# Patient Record
Sex: MALE | Race: BLACK OR AFRICAN AMERICAN | NOT HISPANIC OR LATINO
[De-identification: names, ages, dates, MRNs, and addresses within clinical notes are randomized per-mention and may not be internally consistent; named-entity substitution may affect disease eponyms.]

---

## 2019-04-30 ENCOUNTER — APPOINTMENT (OUTPATIENT)
Dept: INTERNAL MEDICINE | Facility: CLINIC | Age: 59
End: 2019-04-30

## 2019-07-29 ENCOUNTER — APPOINTMENT (OUTPATIENT)
Dept: INTERNAL MEDICINE | Facility: CLINIC | Age: 59
End: 2019-07-29
Payer: MEDICARE

## 2019-07-29 VITALS
SYSTOLIC BLOOD PRESSURE: 142 MMHG | BODY MASS INDEX: 42.66 KG/M2 | DIASTOLIC BLOOD PRESSURE: 86 MMHG | WEIGHT: 315 LBS | HEIGHT: 72 IN

## 2019-07-29 DIAGNOSIS — Z82.49 FAMILY HISTORY OF ISCHEMIC HEART DISEASE AND OTHER DISEASES OF THE CIRCULATORY SYSTEM: ICD-10-CM

## 2019-07-29 DIAGNOSIS — Z00.00 ENCOUNTER FOR GENERAL ADULT MEDICAL EXAMINATION W/OUT ABNORMAL FINDINGS: ICD-10-CM

## 2019-07-29 DIAGNOSIS — I25.10 ATHEROSCLEROTIC HEART DISEASE OF NATIVE CORONARY ARTERY W/OUT ANGINA PECTORIS: ICD-10-CM

## 2019-07-29 DIAGNOSIS — Z78.9 OTHER SPECIFIED HEALTH STATUS: ICD-10-CM

## 2019-07-29 PROCEDURE — 90715 TDAP VACCINE 7 YRS/> IM: CPT | Mod: GY

## 2019-07-29 PROCEDURE — 90472 IMMUNIZATION ADMIN EACH ADD: CPT | Mod: GY

## 2019-07-29 PROCEDURE — 99204 OFFICE O/P NEW MOD 45 MIN: CPT | Mod: 25

## 2019-07-29 PROCEDURE — 90732 PPSV23 VACC 2 YRS+ SUBQ/IM: CPT

## 2019-07-29 PROCEDURE — 36415 COLL VENOUS BLD VENIPUNCTURE: CPT

## 2019-07-29 PROCEDURE — G0009: CPT

## 2019-07-29 NOTE — PHYSICAL EXAM
[No JVD] : no jugular venous distention [Normal] : no respiratory distress, lungs were clear to auscultation bilaterally and no accessory muscle use [Regular Rhythm] : with a regular rhythm [Normal S1, S2] : normal S1 and S2 [Soft] : abdomen soft [Non Tender] : non-tender [Normal Bowel Sounds] : normal bowel sounds [Normal Affect] : the affect was normal [Alert and Oriented x3] : oriented to person, place, and time [Normal Mood] : the mood was normal [Normal Insight/Judgement] : insight and judgment were intact [de-identified] : obese [de-identified] : II/VI systolic murmur [de-identified] : mild edema [de-identified] : surgical wounds healing well

## 2019-07-29 NOTE — HISTORY OF PRESENT ILLNESS
[FreeTextEntry1] : consultation  [de-identified] : Pt here to establish care and to follow up on chronic conditions. He was just diagnosed with CHF this year. He also had his appendix removed 2 weeks ago at [facility]. He is feeling well post operatively. No fever and he has been eating and defecating without issue. \par Pt states he also had a cardiac cath early on this year and had no stents placed. \par No chest pain or SOB currently. \par He does not exercise regularly but plans on starting.

## 2019-07-29 NOTE — HEALTH RISK ASSESSMENT
[Good] : ~his/her~ current health as good [Very Good] : ~his/her~  mood as very good [Yes] : Yes [Monthly or less (1 pt)] : Monthly or less (1 point) [1 or 2 (0 pts)] : 1 or 2 (0 points) [Never (0 pts)] : Never (0 points) [No] : In the past 12 months have you used drugs other than those required for medical reasons? No [No falls in past year] : Patient reported no falls in the past year [0] : 2) Feeling down, depressed, or hopeless: Not at all (0) [HIV Test offered] : HIV Test offered [Hepatitis C test offered] : Hepatitis C test offered [Patient reported colonoscopy was normal] : Patient reported colonoscopy was normal [] : No [de-identified] : walking [de-identified] : none [ColonoscopyDate] : 01/18 [ColonoscopyComments] : normal as per pt, in [place]

## 2019-07-29 NOTE — PLAN
[FreeTextEntry1] : Pneumococcal given today as pt has DM\par Also given Tdap\par Records for Dr Miller reviewed.

## 2019-07-29 NOTE — COUNSELING
[Weight management counseling provided] : Weight management [Healthy eating counseling provided] : healthy eating [Activity counseling provided] : activity [de-identified] : Pt counseled on proper diet and exercise. We discussed the importance of exercise in maintaining a healthy life style.\par

## 2019-07-30 LAB — TSH SERPL-ACNC: 2.31 UIU/ML

## 2019-07-31 LAB
ALBUMIN SERPL ELPH-MCNC: 4.5 G/DL
ALP BLD-CCNC: 64 U/L
ALT SERPL-CCNC: 12 U/L
ANION GAP SERPL CALC-SCNC: 13 MMOL/L
AST SERPL-CCNC: 17 U/L
BASOPHILS # BLD AUTO: 0.06 K/UL
BASOPHILS NFR BLD AUTO: 1 %
BILIRUB SERPL-MCNC: 0.5 MG/DL
BUN SERPL-MCNC: 29 MG/DL
CALCIUM SERPL-MCNC: 9.7 MG/DL
CHLORIDE SERPL-SCNC: 106 MMOL/L
CHOLEST SERPL-MCNC: 210 MG/DL
CHOLEST/HDLC SERPL: 4.5 RATIO
CO2 SERPL-SCNC: 28 MMOL/L
CREAT SERPL-MCNC: 1.51 MG/DL
EOSINOPHIL # BLD AUTO: 0.13 K/UL
EOSINOPHIL NFR BLD AUTO: 2.1 %
ESTIMATED AVERAGE GLUCOSE: 229 MG/DL
GLUCOSE SERPL-MCNC: 137 MG/DL
HBA1C MFR BLD HPLC: 9.6 %
HCT VFR BLD CALC: 54 %
HDLC SERPL-MCNC: 47 MG/DL
HGB BLD-MCNC: 15.6 G/DL
IMM GRANULOCYTES NFR BLD AUTO: 0.3 %
LDLC SERPL CALC-MCNC: 147 MG/DL
LYMPHOCYTES # BLD AUTO: 1.6 K/UL
LYMPHOCYTES NFR BLD AUTO: 26.2 %
MAN DIFF?: NORMAL
MCHC RBC-ENTMCNC: 24.2 PG
MCHC RBC-ENTMCNC: 28.9 GM/DL
MCV RBC AUTO: 83.9 FL
MONOCYTES # BLD AUTO: 0.61 K/UL
MONOCYTES NFR BLD AUTO: 10 %
NEUTROPHILS # BLD AUTO: 3.69 K/UL
NEUTROPHILS NFR BLD AUTO: 60.4 %
PLATELET # BLD AUTO: 200 K/UL
POTASSIUM SERPL-SCNC: 5.1 MMOL/L
PROT SERPL-MCNC: 7.3 G/DL
RBC # BLD: 6.44 M/UL
RBC # FLD: 16.4 %
SODIUM SERPL-SCNC: 146 MMOL/L
TRIGL SERPL-MCNC: 79 MG/DL
WBC # FLD AUTO: 6.11 K/UL

## 2019-07-31 RX ORDER — SIMVASTATIN 20 MG/1
20 TABLET, FILM COATED ORAL
Refills: 0 | Status: DISCONTINUED | COMMUNITY
End: 2019-07-31

## 2019-08-02 ENCOUNTER — RX RENEWAL (OUTPATIENT)
Age: 59
End: 2019-08-02

## 2019-08-07 ENCOUNTER — RX RENEWAL (OUTPATIENT)
Age: 59
End: 2019-08-07

## 2019-10-28 ENCOUNTER — APPOINTMENT (OUTPATIENT)
Dept: INTERNAL MEDICINE | Facility: CLINIC | Age: 59
End: 2019-10-28

## 2019-12-17 ENCOUNTER — APPOINTMENT (OUTPATIENT)
Dept: RHEUMATOLOGY | Facility: CLINIC | Age: 59
End: 2019-12-17

## 2019-12-17 ENCOUNTER — APPOINTMENT (OUTPATIENT)
Dept: INTERNAL MEDICINE | Facility: CLINIC | Age: 59
End: 2019-12-17
Payer: MEDICARE

## 2019-12-17 VITALS
SYSTOLIC BLOOD PRESSURE: 128 MMHG | HEIGHT: 72 IN | DIASTOLIC BLOOD PRESSURE: 66 MMHG | BODY MASS INDEX: 42.66 KG/M2 | WEIGHT: 315 LBS

## 2019-12-17 PROCEDURE — 36415 COLL VENOUS BLD VENIPUNCTURE: CPT

## 2019-12-17 PROCEDURE — G0008: CPT

## 2019-12-17 PROCEDURE — 90686 IIV4 VACC NO PRSV 0.5 ML IM: CPT

## 2019-12-17 PROCEDURE — 99214 OFFICE O/P EST MOD 30 MIN: CPT | Mod: 25

## 2019-12-17 NOTE — HEALTH RISK ASSESSMENT
[Yes] : Yes [1 or 2 (0 pts)] : 1 or 2 (0 points) [Never (0 pts)] : Never (0 points) [No falls in past year] : Patient reported no falls in the past year [0] : 2) Feeling down, depressed, or hopeless: Not at all (0) [Patient reported colonoscopy was normal] : Patient reported colonoscopy was normal [HIV Test offered] : HIV Test offered [Hepatitis C test offered] : Hepatitis C test offered [ColonoscopyDate] : 01/18 [ColonoscopyComments] : normal as per pt, in [place] [] : No [de-identified] : walking  [de-identified] : regular diet

## 2019-12-17 NOTE — PHYSICAL EXAM
[No JVD] : no jugular venous distention [Supple] : supple [No Respiratory Distress] : no respiratory distress  [No Accessory Muscle Use] : no accessory muscle use [Clear to Auscultation] : lungs were clear to auscultation bilaterally [Normal Rate] : normal rate  [Regular Rhythm] : with a regular rhythm [No Edema] : there was no peripheral edema [de-identified] : Morbidly Obesity [de-identified] : II-VI systolic murmur

## 2019-12-17 NOTE — HISTORY OF PRESENT ILLNESS
[FreeTextEntry1] : follow-up [de-identified] : Pt here for f/u visit. Says he has been eating more greens but has still gained weight. No chest pains or SOB. He tries to do some walking but gets pain in her legs. He is compliant with his insulin pre meals as well as basiglar and farixga.

## 2019-12-19 LAB
ALBUMIN SERPL ELPH-MCNC: 4.1 G/DL
ALP BLD-CCNC: 61 U/L
ALT SERPL-CCNC: 9 U/L
ANION GAP SERPL CALC-SCNC: 12 MMOL/L
AST SERPL-CCNC: 16 U/L
BILIRUB SERPL-MCNC: 1 MG/DL
BUN SERPL-MCNC: 18 MG/DL
CALCIUM SERPL-MCNC: 9.2 MG/DL
CHLORIDE SERPL-SCNC: 107 MMOL/L
CHOLEST SERPL-MCNC: 126 MG/DL
CHOLEST/HDLC SERPL: 3.3 RATIO
CO2 SERPL-SCNC: 25 MMOL/L
CREAT SERPL-MCNC: 1.31 MG/DL
GLUCOSE SERPL-MCNC: 89 MG/DL
HDLC SERPL-MCNC: 38 MG/DL
LDLC SERPL CALC-MCNC: 75 MG/DL
POTASSIUM SERPL-SCNC: 4.8 MMOL/L
PROT SERPL-MCNC: 6.9 G/DL
PSA SERPL-MCNC: 1.09 NG/ML
SODIUM SERPL-SCNC: 144 MMOL/L
TRIGL SERPL-MCNC: 64 MG/DL

## 2019-12-23 LAB
CREAT SPEC-SCNC: 170 MG/DL
ESTIMATED AVERAGE GLUCOSE: 166 MG/DL
HBA1C MFR BLD HPLC: 7.4 %
MICROALBUMIN 24H UR DL<=1MG/L-MCNC: 145.3 MG/DL
MICROALBUMIN/CREAT 24H UR-RTO: 855 MG/G

## 2020-01-21 ENCOUNTER — RX RENEWAL (OUTPATIENT)
Age: 60
End: 2020-01-21

## 2020-03-13 ENCOUNTER — APPOINTMENT (OUTPATIENT)
Dept: RHEUMATOLOGY | Facility: CLINIC | Age: 60
End: 2020-03-13
Payer: MEDICARE

## 2020-03-13 ENCOUNTER — APPOINTMENT (OUTPATIENT)
Dept: INTERNAL MEDICINE | Facility: CLINIC | Age: 60
End: 2020-03-13
Payer: MEDICARE

## 2020-03-13 VITALS
DIASTOLIC BLOOD PRESSURE: 82 MMHG | HEIGHT: 72 IN | BODY MASS INDEX: 42.66 KG/M2 | WEIGHT: 315 LBS | SYSTOLIC BLOOD PRESSURE: 144 MMHG

## 2020-03-13 VITALS
DIASTOLIC BLOOD PRESSURE: 82 MMHG | HEIGHT: 72 IN | SYSTOLIC BLOOD PRESSURE: 144 MMHG | BODY MASS INDEX: 42.66 KG/M2 | WEIGHT: 315 LBS

## 2020-03-13 DIAGNOSIS — Z87.2 PERSONAL HISTORY OF DISEASES OF THE SKIN AND SUBCUTANEOUS TISSUE: ICD-10-CM

## 2020-03-13 DIAGNOSIS — Z12.5 ENCOUNTER FOR SCREENING FOR MALIGNANT NEOPLASM OF PROSTATE: ICD-10-CM

## 2020-03-13 PROCEDURE — 99204 OFFICE O/P NEW MOD 45 MIN: CPT | Mod: 25

## 2020-03-13 PROCEDURE — 99214 OFFICE O/P EST MOD 30 MIN: CPT

## 2020-03-13 PROCEDURE — 36415 COLL VENOUS BLD VENIPUNCTURE: CPT

## 2020-03-13 NOTE — HISTORY OF PRESENT ILLNESS
[FreeTextEntry1] : follow-up [de-identified] : Pt here for f/u visit. He recently had a defib placed. He was in the midst of moving and realized that he was more SOB than he usually would be. That night he had a lot of swelling, including scrotal swelling. He was taken to Er and admitted to [facility]. He had an echo done and the next day a defib was placed. Pt feels well now. Still sore from the PPM plmt. No chest pain or unusual SOB. Still has mild swelling in LE. \par Pt has been eating a lot of greens and trying to consume a healthy diet.

## 2020-03-13 NOTE — COUNSELING
[Potential consequences of obesity discussed] : Potential consequences of obesity discussed [Benefits of weight loss discussed] : Benefits of weight loss discussed [Encouraged to increase physical activity] : Encouraged to increase physical activity [Encouraged to use exercise tracking device] : Encouraged to use exercise tracking device [FreeTextEntry1] : weight watchers [FreeTextEntry2] : Pt counseled on proper diet and exercise. We discussed the importance of exercise in maintaining a healthy life style.\par

## 2020-03-13 NOTE — REVIEW OF SYSTEMS
[Lower Ext Edema] : lower extremity edema [Negative] : Psychiatric [Fever] : no fever [Chills] : no chills [Abdominal Pain] : no abdominal pain [Nausea] : no nausea [Diarrhea] : no diarrhea [Vomiting] : no vomiting [Easy Bleeding] : no easy bleeding

## 2020-03-13 NOTE — PHYSICAL EXAM
[No Acute Distress] : no acute distress [Well-Appearing] : well-appearing [No JVD] : no jugular venous distention [Supple] : supple [No Respiratory Distress] : no respiratory distress  [No Accessory Muscle Use] : no accessory muscle use [Clear to Auscultation] : lungs were clear to auscultation bilaterally [Normal Rate] : normal rate  [Regular Rhythm] : with a regular rhythm [Normal S1, S2] : normal S1 and S2 [Coordination Grossly Intact] : coordination grossly intact [No Focal Deficits] : no focal deficits [Normal Gait] : normal gait [Normal Affect] : the affect was normal [Alert and Oriented x3] : oriented to person, place, and time [Normal Mood] : the mood was normal [Normal Insight/Judgement] : insight and judgment were intact [de-identified] : Morbidly Obese [de-identified] : II-VI systolic murmur, blowing [de-identified] : mild edema b/l LE, no ulcers

## 2020-03-13 NOTE — HEALTH RISK ASSESSMENT
[No] : In the past 12 months have you used drugs other than those required for medical reasons? No [No falls in past year] : Patient reported no falls in the past year [0] : 2) Feeling down, depressed, or hopeless: Not at all (0) [Patient reported colonoscopy was normal] : Patient reported colonoscopy was normal [HIV Test offered] : HIV Test offered [Hepatitis C test offered] : Hepatitis C test offered [] : No [de-identified] : none [de-identified] : none [ColonoscopyDate] : 01/18 [ColonoscopyComments] : normal as per pt, in [place]

## 2020-03-15 LAB
25(OH)D3 SERPL-MCNC: 12.2 NG/ML
ALBUMIN SERPL ELPH-MCNC: 4.1 G/DL
ALP BLD-CCNC: 65 U/L
ALT SERPL-CCNC: 13 U/L
ANA SER IF-ACNC: NEGATIVE
ANION GAP SERPL CALC-SCNC: 16 MMOL/L
AST SERPL-CCNC: 18 U/L
BASOPHILS # BLD AUTO: 0.04 K/UL
BASOPHILS NFR BLD AUTO: 0.7 %
BILIRUB SERPL-MCNC: 0.4 MG/DL
BUN SERPL-MCNC: 14 MG/DL
CALCIUM SERPL-MCNC: 9.1 MG/DL
CHLORIDE SERPL-SCNC: 108 MMOL/L
CHROMATIN AB SERPL-ACNC: <0.2 AL
CO2 SERPL-SCNC: 20 MMOL/L
CREAT SERPL-MCNC: 1.32 MG/DL
CRP SERPL-MCNC: 1.3 MG/DL
ENA JO1 AB SER IA-ACNC: <0.2 AL
ENA RNP AB SER IA-ACNC: <0.2 AL
ENA SM AB SER IA-ACNC: <0.2 AL
ENA SS-A AB SER IA-ACNC: <0.2 AL
ENA SS-B AB SER IA-ACNC: <0.2 AL
EOSINOPHIL # BLD AUTO: 0.23 K/UL
EOSINOPHIL NFR BLD AUTO: 3.8 %
ERYTHROCYTE [SEDIMENTATION RATE] IN BLOOD BY WESTERGREN METHOD: 26 MM/HR
GLUCOSE SERPL-MCNC: 121 MG/DL
HCT VFR BLD CALC: 45.7 %
HGB BLD-MCNC: 13.7 G/DL
IMM GRANULOCYTES NFR BLD AUTO: 0.3 %
LYMPHOCYTES # BLD AUTO: 1.37 K/UL
LYMPHOCYTES NFR BLD AUTO: 22.5 %
MAN DIFF?: NORMAL
MCHC RBC-ENTMCNC: 24.8 PG
MCHC RBC-ENTMCNC: 30 GM/DL
MCV RBC AUTO: 82.8 FL
MONOCYTES # BLD AUTO: 0.61 K/UL
MONOCYTES NFR BLD AUTO: 10 %
NEUTROPHILS # BLD AUTO: 3.82 K/UL
NEUTROPHILS NFR BLD AUTO: 62.7 %
PLATELET # BLD AUTO: 181 K/UL
POTASSIUM SERPL-SCNC: 4.2 MMOL/L
PROT SERPL-MCNC: 7 G/DL
RBC # BLD: 5.52 M/UL
RBC # FLD: 15 %
RHEUMATOID FACT SER QL: <10 IU/ML
SODIUM SERPL-SCNC: 144 MMOL/L
WBC # FLD AUTO: 6.09 K/UL

## 2020-03-16 LAB
CCP AB SER IA-ACNC: <8 UNITS
DSDNA AB SER-ACNC: <12 IU/ML
RF+CCP IGG SER-IMP: NEGATIVE

## 2020-03-17 LAB
ACE BLD-CCNC: 24 U/L
HLA-B27 RELATED AG QL: NORMAL
RNA POLYMERASE III IGG: 4 UNITS

## 2020-03-20 NOTE — HISTORY OF PRESENT ILLNESS
[FreeTextEntry1] : 58 yo male referred by Dr. Dumont for further evaluation of joint pains.  He was diagnosed with reactive arthritis in 2004.  He was seeing a pain management doctor.\par In 2004 he was going about his usual business and he developed a pain and swelling in his left foot.  He thought it was gout, but was told it was not.  He was given antiinflammatories.  The pain radiated up his legs and into his .  the pain was so bad he called 911 and was taken to the hospital.  He was hospitalized for 3 months bc they did not know what he had.  Eventually he was diagnosed with reactive arthritis.  the pain just subsided on his own.  he was wheelchair bound, then on a walker, then crutches and had to relearn how to walk.\par He gets intermittent pains.  Pain management doctor gives him Percocet for the pain.\par He had an AICD a few weeks ago bc he had bad CHF.\par He has good days and bad days.  Pain comes on mostly in his "hip " area (points to side of thighs)\par Rare swelling/pain in his feet.  No symptoms in his hands.  No rashes.\par No major low back pain.  No numbness/tingling.\par No incontinence.\par No oral ulcers.  No dry eyes or mouth.\par No Raynauds.\par no FH of arthritis or autoimmune disease

## 2020-04-03 ENCOUNTER — APPOINTMENT (OUTPATIENT)
Dept: RHEUMATOLOGY | Facility: CLINIC | Age: 60
End: 2020-04-03
Payer: MEDICARE

## 2020-04-03 PROCEDURE — G2012 BRIEF CHECK IN BY MD/QHP: CPT

## 2020-05-14 ENCOUNTER — RX RENEWAL (OUTPATIENT)
Age: 60
End: 2020-05-14

## 2020-06-12 ENCOUNTER — APPOINTMENT (OUTPATIENT)
Dept: INTERNAL MEDICINE | Facility: CLINIC | Age: 60
End: 2020-06-12

## 2020-07-09 ENCOUNTER — APPOINTMENT (OUTPATIENT)
Dept: INTERNAL MEDICINE | Facility: CLINIC | Age: 60
End: 2020-07-09
Payer: MEDICARE

## 2020-07-09 VITALS
HEIGHT: 72 IN | WEIGHT: 315 LBS | SYSTOLIC BLOOD PRESSURE: 132 MMHG | TEMPERATURE: 98.6 F | BODY MASS INDEX: 42.66 KG/M2 | DIASTOLIC BLOOD PRESSURE: 76 MMHG

## 2020-07-09 PROCEDURE — 36415 COLL VENOUS BLD VENIPUNCTURE: CPT

## 2020-07-09 PROCEDURE — 99214 OFFICE O/P EST MOD 30 MIN: CPT | Mod: 25

## 2020-07-09 NOTE — HEALTH RISK ASSESSMENT
[No] : In the past 12 months have you used drugs other than those required for medical reasons? No [No falls in past year] : Patient reported no falls in the past year [0] : 2) Feeling down, depressed, or hopeless: Not at all (0) [Patient reported colonoscopy was normal] : Patient reported colonoscopy was normal [HIV Test offered] : HIV Test offered [Hepatitis C test offered] : Hepatitis C test offered [With Significant Other] : lives with significant other [] :  [None] : None [On disability] : on disability [Feels Safe at Home] : Feels safe at home [Fully functional (bathing, dressing, toileting, transferring, walking, feeding)] : Fully functional (bathing, dressing, toileting, transferring, walking, feeding) [Fully functional (using the telephone, shopping, preparing meals, housekeeping, doing laundry, using] : Fully functional and needs no help or supervision to perform IADLs (using the telephone, shopping, preparing meals, housekeeping, doing laundry, using transportation, managing medications and managing finances) [] : No [de-identified] : walking  [de-identified] : none [ColonoscopyDate] : 01/18 [ColonoscopyComments] : normal as per pt, in [place]

## 2020-07-09 NOTE — HISTORY OF PRESENT ILLNESS
[FreeTextEntry1] : follow-up  [de-identified] : Pt here for reg f/u visit. Pt had some LE swelling about 1 month ago and was inpatient at [facility]. He was given diuretics and d/cd him on diuretics.  Currently swelling is much better. No chest pains or SOB.\par He has been walking for exercise and has lost 14 lb in 3 months. \par He has also now been following with Dr Saha.

## 2020-07-09 NOTE — REVIEW OF SYSTEMS
[Lower Ext Edema] : lower extremity edema [Fever] : no fever [Negative] : Gastrointestinal [FreeTextEntry2] : -14 lb weight loss [FreeTextEntry9] : no pain currently

## 2020-07-09 NOTE — PHYSICAL EXAM
[No JVD] : no jugular venous distention [Well-Appearing] : well-appearing [No Acute Distress] : no acute distress [No Accessory Muscle Use] : no accessory muscle use [No Respiratory Distress] : no respiratory distress  [Supple] : supple [Normal Rate] : normal rate  [Clear to Auscultation] : lungs were clear to auscultation bilaterally [Regular Rhythm] : with a regular rhythm [Normal S1, S2] : normal S1 and S2 [Coordination Grossly Intact] : coordination grossly intact [No Focal Deficits] : no focal deficits [Normal Gait] : normal gait [Normal Affect] : the affect was normal [Alert and Oriented x3] : oriented to person, place, and time [Normal Insight/Judgement] : insight and judgment were intact [Normal Mood] : the mood was normal [de-identified] : Obese [de-identified] : + edema b/l, R>L, approx 1+, scars on LE [de-identified] : II-VI systolic murmur, blowing

## 2020-07-13 LAB
ALBUMIN SERPL ELPH-MCNC: 4.4 G/DL
ALP BLD-CCNC: 75 U/L
ALT SERPL-CCNC: 12 U/L
ANION GAP SERPL CALC-SCNC: 14 MMOL/L
AST SERPL-CCNC: 15 U/L
BASOPHILS # BLD AUTO: 0.05 K/UL
BASOPHILS NFR BLD AUTO: 0.7 %
BILIRUB SERPL-MCNC: 0.3 MG/DL
BUN SERPL-MCNC: 25 MG/DL
CALCIUM SERPL-MCNC: 9.1 MG/DL
CHLORIDE SERPL-SCNC: 101 MMOL/L
CHOLEST SERPL-MCNC: 155 MG/DL
CHOLEST/HDLC SERPL: 3.7 RATIO
CO2 SERPL-SCNC: 26 MMOL/L
CREAT SERPL-MCNC: 1.57 MG/DL
CREAT SPEC-SCNC: 48 MG/DL
EOSINOPHIL # BLD AUTO: 0.29 K/UL
EOSINOPHIL NFR BLD AUTO: 3.9 %
ESTIMATED AVERAGE GLUCOSE: 154 MG/DL
GLUCOSE SERPL-MCNC: 322 MG/DL
HBA1C MFR BLD HPLC: 7 %
HCT VFR BLD CALC: 45.5 %
HDLC SERPL-MCNC: 42 MG/DL
HGB BLD-MCNC: 13.6 G/DL
IMM GRANULOCYTES NFR BLD AUTO: 0.4 %
LDLC SERPL CALC-MCNC: 95 MG/DL
LYMPHOCYTES # BLD AUTO: 1.33 K/UL
LYMPHOCYTES NFR BLD AUTO: 18 %
MAN DIFF?: NORMAL
MCHC RBC-ENTMCNC: 25 PG
MCHC RBC-ENTMCNC: 29.9 GM/DL
MCV RBC AUTO: 83.8 FL
MICROALBUMIN 24H UR DL<=1MG/L-MCNC: 1.4 MG/DL
MICROALBUMIN/CREAT 24H UR-RTO: 29 MG/G
MONOCYTES # BLD AUTO: 0.69 K/UL
MONOCYTES NFR BLD AUTO: 9.3 %
NEUTROPHILS # BLD AUTO: 5.01 K/UL
NEUTROPHILS NFR BLD AUTO: 67.7 %
PLATELET # BLD AUTO: 214 K/UL
POTASSIUM SERPL-SCNC: 4.8 MMOL/L
PROT SERPL-MCNC: 7.4 G/DL
RBC # BLD: 5.43 M/UL
RBC # FLD: 14.9 %
SODIUM SERPL-SCNC: 140 MMOL/L
TRIGL SERPL-MCNC: 91 MG/DL
WBC # FLD AUTO: 7.4 K/UL

## 2020-07-30 ENCOUNTER — APPOINTMENT (OUTPATIENT)
Dept: RHEUMATOLOGY | Facility: CLINIC | Age: 60
End: 2020-07-30
Payer: MEDICARE

## 2020-07-30 VITALS
DIASTOLIC BLOOD PRESSURE: 80 MMHG | WEIGHT: 315 LBS | SYSTOLIC BLOOD PRESSURE: 130 MMHG | BODY MASS INDEX: 42.66 KG/M2 | HEIGHT: 72 IN

## 2020-07-30 DIAGNOSIS — M70.62 TROCHANTERIC BURSITIS, RIGHT HIP: ICD-10-CM

## 2020-07-30 DIAGNOSIS — M70.61 TROCHANTERIC BURSITIS, RIGHT HIP: ICD-10-CM

## 2020-07-30 PROCEDURE — 99214 OFFICE O/P EST MOD 30 MIN: CPT

## 2020-08-02 PROBLEM — M70.61 TROCHANTERIC BURSITIS OF BOTH HIPS: Status: ACTIVE | Noted: 2020-04-03

## 2020-08-02 NOTE — REVIEW OF SYSTEMS
[Feeling Poorly] : feeling poorly [Feeling Tired] : feeling tired [Joint Stiffness] : joint stiffness [Joint Pain] : joint pain [Negative] : Heme/Lymph

## 2020-08-02 NOTE — HISTORY OF PRESENT ILLNESS
[FreeTextEntry1] : He saw cardiologist and was found to have a leaky valve, and will need valve replacement surgery.\par He gets pain in his hips and legs, especially when walking.  He has low back pain and pain on the sides of his highs is he moves a lot and when he first wakes up.\par No pain in small joints.  \par Mild morning stiffness that improves once he moves.\par No rashes.

## 2020-09-24 ENCOUNTER — RX RENEWAL (OUTPATIENT)
Age: 60
End: 2020-09-24

## 2020-10-05 ENCOUNTER — APPOINTMENT (OUTPATIENT)
Dept: CARDIOLOGY | Facility: CLINIC | Age: 60
End: 2020-10-05
Payer: MEDICARE

## 2020-10-05 ENCOUNTER — NON-APPOINTMENT (OUTPATIENT)
Age: 60
End: 2020-10-05

## 2020-10-05 VITALS
HEIGHT: 72 IN | WEIGHT: 315 LBS | DIASTOLIC BLOOD PRESSURE: 80 MMHG | SYSTOLIC BLOOD PRESSURE: 126 MMHG | BODY MASS INDEX: 42.66 KG/M2

## 2020-10-05 PROCEDURE — 99204 OFFICE O/P NEW MOD 45 MIN: CPT | Mod: 25

## 2020-10-05 PROCEDURE — 93000 ELECTROCARDIOGRAM COMPLETE: CPT

## 2020-10-05 NOTE — PHYSICAL EXAM
[General Appearance - Well Developed] : well developed [Normal Appearance] : normal appearance [Well Groomed] : well groomed [General Appearance - Well Nourished] : well nourished [No Deformities] : no deformities [General Appearance - In No Acute Distress] : no acute distress [Normal Conjunctiva] : the conjunctiva exhibited no abnormalities [Eyelids - No Xanthelasma] : the eyelids demonstrated no xanthelasmas [Normal Oral Mucosa] : normal oral mucosa [No Oral Pallor] : no oral pallor [No Oral Cyanosis] : no oral cyanosis [Normal Jugular Venous A Waves Present] : normal jugular venous A waves present [Normal Jugular Venous V Waves Present] : normal jugular venous V waves present [No Jugular Venous Hubbard A Waves] : no jugular venous hubbard A waves [Heart Rate And Rhythm] : heart rate and rhythm were normal [Heart Sounds] : normal S1 and S2 [Respiration, Rhythm And Depth] : normal respiratory rhythm and effort [Exaggerated Use Of Accessory Muscles For Inspiration] : no accessory muscle use [Auscultation Breath Sounds / Voice Sounds] : lungs were clear to auscultation bilaterally [Abdomen Soft] : soft [Abdomen Tenderness] : non-tender [Abdomen Mass (___ Cm)] : no abdominal mass palpated [Abnormal Walk] : normal gait [Gait - Sufficient For Exercise Testing] : the gait was sufficient for exercise testing [Nail Clubbing] : no clubbing of the fingernails [Cyanosis, Localized] : no localized cyanosis [Petechial Hemorrhages (___cm)] : no petechial hemorrhages [Skin Color & Pigmentation] : normal skin color and pigmentation [] : no rash [No Venous Stasis] : no venous stasis [Skin Lesions] : no skin lesions [No Skin Ulcers] : no skin ulcer [No Xanthoma] : no  xanthoma was observed [Oriented To Time, Place, And Person] : oriented to person, place, and time [Affect] : the affect was normal [Mood] : the mood was normal [No Anxiety] : not feeling anxious [FreeTextEntry1] : 3/6 OZZY MARTINEZ

## 2020-10-05 NOTE — ASSESSMENT
[FreeTextEntry1] : stress echo to assess aortic valve with lumason\par obtain peak aortic velocities \par may require eventual dobutamine. \par \par eventual change losartan and lasix to entresto. \par \par medtronic defib CLARIA - requires interrogation.

## 2020-10-05 NOTE — HISTORY OF PRESENT ILLNESS
[FreeTextEntry1] : Pt is here for second opinion of his cardiac status. \par \par Mr. DIPESH JOHNSON  is a 60 year year old M with pmhx of HFpEF, moderate aortic stenosis (cath march 2019),  s/p medtoroinic defib, HTN, DM, HL, sleep apnea presents with complaints of sob on exertion. Other associated symptoms include intermittent edema. \par Pt denies symptoms of chest pain, lightheadedness, dizziness, syncope, claudication, orthopnea, PND. \par \par Patient denies history of Myocardial infarction, stroke or TIA, coronary intervention, peripheral vascular disease, aortic aneurysm or renal impairment. \par \par Resting EKG revealed normal sinus rhythm with V pacing.

## 2020-10-08 ENCOUNTER — APPOINTMENT (OUTPATIENT)
Dept: INTERNAL MEDICINE | Facility: CLINIC | Age: 60
End: 2020-10-08
Payer: MEDICARE

## 2020-10-08 VITALS
HEIGHT: 72 IN | DIASTOLIC BLOOD PRESSURE: 74 MMHG | SYSTOLIC BLOOD PRESSURE: 128 MMHG | TEMPERATURE: 98.7 F | BODY MASS INDEX: 42.26 KG/M2 | WEIGHT: 312 LBS

## 2020-10-08 PROCEDURE — 90662 IIV NO PRSV INCREASED AG IM: CPT

## 2020-10-08 PROCEDURE — G0008: CPT

## 2020-10-08 PROCEDURE — 99214 OFFICE O/P EST MOD 30 MIN: CPT | Mod: 25

## 2020-10-08 PROCEDURE — 36415 COLL VENOUS BLD VENIPUNCTURE: CPT

## 2020-10-08 RX ORDER — FLUOCINONIDE 0.5 MG/G
0.05 CREAM TOPICAL
Qty: 30 | Refills: 0 | Status: ACTIVE | COMMUNITY
Start: 2020-08-11

## 2020-10-08 NOTE — PHYSICAL EXAM
[No JVD] : no jugular venous distention [Normal] : affect was normal and insight and judgment were intact [de-identified] : + PPM ACW, II/VI blowing systolic murmur [de-identified] : m

## 2020-10-08 NOTE — HISTORY OF PRESENT ILLNESS
[FreeTextEntry1] : follow-up [de-identified] : Pt here for reg follow up visit and flu vaccine. Feels well overall. He has switched cardiologists to Dr Hawkins and recently saw him. No complaints of chest pains or SOB. He has had a 4 lb weight loss since last visit with me. He has been trying to walk for exercise with his dog.\par He does not check BGM at home. Last A1c was 7.0.\par He has been trying to hydrate well.

## 2020-10-08 NOTE — HEALTH RISK ASSESSMENT
[No] : In the past 12 months have you used drugs other than those required for medical reasons? No [No falls in past year] : Patient reported no falls in the past year [0] : 2) Feeling down, depressed, or hopeless: Not at all (0) [] : No [de-identified] : regular diet  [de-identified] : none

## 2020-10-12 LAB
ALBUMIN SERPL ELPH-MCNC: 4.3 G/DL
ALP BLD-CCNC: 78 U/L
ALT SERPL-CCNC: 13 U/L
ANION GAP SERPL CALC-SCNC: 16 MMOL/L
AST SERPL-CCNC: 15 U/L
BILIRUB SERPL-MCNC: 1.2 MG/DL
BUN SERPL-MCNC: 29 MG/DL
CALCIUM SERPL-MCNC: 9.5 MG/DL
CHLORIDE SERPL-SCNC: 100 MMOL/L
CO2 SERPL-SCNC: 23 MMOL/L
CREAT SERPL-MCNC: 1.47 MG/DL
GLUCOSE SERPL-MCNC: 424 MG/DL
POTASSIUM SERPL-SCNC: 4.6 MMOL/L
PROT SERPL-MCNC: 7.1 G/DL
SODIUM SERPL-SCNC: 139 MMOL/L

## 2020-10-14 LAB
ESTIMATED AVERAGE GLUCOSE: 289 MG/DL
HBA1C MFR BLD HPLC: 11.7 %

## 2020-10-18 ENCOUNTER — RESULT REVIEW (OUTPATIENT)
Age: 60
End: 2020-10-18

## 2020-10-26 ENCOUNTER — APPOINTMENT (OUTPATIENT)
Dept: CARDIOLOGY | Facility: CLINIC | Age: 60
End: 2020-10-26
Payer: MEDICARE

## 2020-10-26 VITALS
DIASTOLIC BLOOD PRESSURE: 80 MMHG | WEIGHT: 312 LBS | SYSTOLIC BLOOD PRESSURE: 140 MMHG | HEIGHT: 72 IN | BODY MASS INDEX: 42.26 KG/M2

## 2020-10-26 PROCEDURE — 99214 OFFICE O/P EST MOD 30 MIN: CPT

## 2020-10-26 NOTE — HISTORY OF PRESENT ILLNESS
[FreeTextEntry1] : Pt is here for second opinion of his cardiac status. \par \par Mr. DIPESH JOHNSON  is a 60 year year old M with pmhx of HFpEF, moderate aortic stenosis (cath march 2019),  s/p Medtronic defib, HTN, DM, HL, sleep apnea presents with complaints of sob on exertion. Other associated symptoms include intermittent edema. \par Pt denies symptoms of chest pain, lightheadedness, dizziness, syncope, claudication, orthopnea, PND. \par \par Patient denies history of Myocardial infarction, stroke or TIA, coronary intervention, peripheral vascular disease, aortic aneurysm or renal impairment. \par \par Resting EKG revealed normal sinus rhythm with V pacing. \par \par since last visit -  unable to tolerate treadmill, has moderate AS on echo - pseudonormalization of T waves as well as alternating bundle on minimal exertion. \par

## 2020-10-26 NOTE — ASSESSMENT
[FreeTextEntry1] : stress echo to assess aortic valve with lumason\par obtain peak aortic velocities \par may require eventual dobutamine. \par \par eventual change losartan and lasix to entresto. \par \par medtronic defib CLARIA - requires interrogation. \par \par Discussed risks and benefits of cardiac catheterization.\par Risks include but not limited to bleeding, infection, vascular compromise, stroke, heart attack, kidney failure, death.\par Benefits include revascularization and resolution of symptoms.\par Patient is in agreement with procedure. Will set up at [facility].\par \par rec dobutamine to assess aortic valve\par \par coros for inferior wall\par

## 2020-10-26 NOTE — PHYSICAL EXAM
[General Appearance - Well Developed] : well developed [Normal Appearance] : normal appearance [Well Groomed] : well groomed [General Appearance - Well Nourished] : well nourished [No Deformities] : no deformities [General Appearance - In No Acute Distress] : no acute distress [Normal Conjunctiva] : the conjunctiva exhibited no abnormalities [Eyelids - No Xanthelasma] : the eyelids demonstrated no xanthelasmas [Normal Oral Mucosa] : normal oral mucosa [No Oral Pallor] : no oral pallor [No Oral Cyanosis] : no oral cyanosis [Normal Jugular Venous A Waves Present] : normal jugular venous A waves present [Normal Jugular Venous V Waves Present] : normal jugular venous V waves present [No Jugular Venous Hubbard A Waves] : no jugular venous hubbard A waves [Respiration, Rhythm And Depth] : normal respiratory rhythm and effort [Exaggerated Use Of Accessory Muscles For Inspiration] : no accessory muscle use [Auscultation Breath Sounds / Voice Sounds] : lungs were clear to auscultation bilaterally [Heart Rate And Rhythm] : heart rate and rhythm were normal [Heart Sounds] : normal S1 and S2 [Abdomen Soft] : soft [Abdomen Tenderness] : non-tender [Abdomen Mass (___ Cm)] : no abdominal mass palpated [Abnormal Walk] : normal gait [Gait - Sufficient For Exercise Testing] : the gait was sufficient for exercise testing [Nail Clubbing] : no clubbing of the fingernails [Cyanosis, Localized] : no localized cyanosis [Petechial Hemorrhages (___cm)] : no petechial hemorrhages [Skin Color & Pigmentation] : normal skin color and pigmentation [] : no rash [No Venous Stasis] : no venous stasis [Skin Lesions] : no skin lesions [No Skin Ulcers] : no skin ulcer [No Xanthoma] : no  xanthoma was observed [Oriented To Time, Place, And Person] : oriented to person, place, and time [Affect] : the affect was normal [Mood] : the mood was normal [No Anxiety] : not feeling anxious [FreeTextEntry1] : 3/6 OZZY MARTINEZ

## 2020-10-29 ENCOUNTER — APPOINTMENT (OUTPATIENT)
Dept: RHEUMATOLOGY | Facility: CLINIC | Age: 60
End: 2020-10-29

## 2020-11-06 ENCOUNTER — APPOINTMENT (OUTPATIENT)
Dept: PODIATRY | Facility: CLINIC | Age: 60
End: 2020-11-06
Payer: MEDICARE

## 2020-11-06 ENCOUNTER — RESULT CHARGE (OUTPATIENT)
Age: 60
End: 2020-11-06

## 2020-11-06 ENCOUNTER — APPOINTMENT (OUTPATIENT)
Dept: ENDOCRINOLOGY | Facility: CLINIC | Age: 60
End: 2020-11-06
Payer: MEDICARE

## 2020-11-06 VITALS — BODY MASS INDEX: 42.26 KG/M2 | HEIGHT: 72 IN | WEIGHT: 312 LBS

## 2020-11-06 VITALS
DIASTOLIC BLOOD PRESSURE: 80 MMHG | BODY MASS INDEX: 42.26 KG/M2 | SYSTOLIC BLOOD PRESSURE: 122 MMHG | WEIGHT: 312 LBS | OXYGEN SATURATION: 95 % | HEART RATE: 72 BPM | HEIGHT: 72 IN

## 2020-11-06 LAB — GLUCOSE BLDC GLUCOMTR-MCNC: 364

## 2020-11-06 PROCEDURE — 99205 OFFICE O/P NEW HI 60 MIN: CPT | Mod: 25

## 2020-11-06 PROCEDURE — 99203 OFFICE O/P NEW LOW 30 MIN: CPT | Mod: 25

## 2020-11-06 PROCEDURE — 82962 GLUCOSE BLOOD TEST: CPT

## 2020-11-06 PROCEDURE — 11721 DEBRIDE NAIL 6 OR MORE: CPT

## 2020-11-06 RX ORDER — INSULIN GLARGINE 100 [IU]/ML
100 INJECTION, SOLUTION SUBCUTANEOUS TWICE DAILY
Qty: 5 | Refills: 5 | Status: DISCONTINUED | COMMUNITY
Start: 1900-01-01 | End: 2020-11-06

## 2020-11-06 RX ORDER — MUPIROCIN 20 MG/G
2 OINTMENT TOPICAL
Qty: 22 | Refills: 0 | Status: DISCONTINUED | COMMUNITY
Start: 2020-08-11 | End: 2020-11-06

## 2020-11-06 NOTE — ASSESSMENT
[Hypoglycemia Management] : hypoglycemia management [Action and use of Insulin] : action and use of short and long-acting insulin [Self Monitoring of Blood Glucose] : self monitoring of blood glucose [Injection Technique, Storage, Sharps Disposal] : injection technique, storage, and sharps disposal [Diabetic Medications] : Risks and benefits of diabetic medications were discussed

## 2020-11-09 NOTE — HISTORY OF PRESENT ILLNESS
[FreeTextEntry1] : Mr. DIPESH JOHNSON is 60 year male .\par DIPESH  was diagnosed of diabetes type 2   years back. Currently he is on  basalgar 42 untis BID \par novlog 12 units bid with two major meals, and farxiga 10 mg daily \par he is tolerating these medications well. \par Glycemia control has been  poor \par has h/o CHF ,CKD \par working with podiatry  as well \par He denies any hyperglycemic symptoms.\par He is due for an annual exam, denies any low blood sugar reactions.  He definitely can improve on his diet.  He is never seen a diabetes educator and has refused to see one today\par Last A1c on 8 October 2020 was 11.7%\par \par \par \par

## 2020-11-09 NOTE — CONSULT LETTER
[Dear  ___] : Dear  [unfilled], [Consult Letter:] : I had the pleasure of evaluating your patient, [unfilled]. [Please see my note below.] : Please see my note below. [Consult Closing:] : Thank you very much for allowing me to participate in the care of this patient.  If you have any questions, please do not hesitate to contact me. [DrJoel  ___] : Dr. MOORE

## 2020-11-10 ENCOUNTER — APPOINTMENT (OUTPATIENT)
Dept: CARDIOLOGY | Facility: CLINIC | Age: 60
End: 2020-11-10

## 2020-12-03 ENCOUNTER — APPOINTMENT (OUTPATIENT)
Dept: PULMONOLOGY | Facility: CLINIC | Age: 60
End: 2020-12-03

## 2020-12-07 ENCOUNTER — APPOINTMENT (OUTPATIENT)
Dept: ENDOCRINOLOGY | Facility: CLINIC | Age: 60
End: 2020-12-07

## 2020-12-30 RX ORDER — INSULIN DEGLUDEC INJECTION 200 U/ML
200 INJECTION, SOLUTION SUBCUTANEOUS DAILY
Qty: 5 | Refills: 0 | Status: DISCONTINUED | COMMUNITY
Start: 2020-11-06 | End: 2020-12-30

## 2020-12-31 ENCOUNTER — RX CHANGE (OUTPATIENT)
Age: 60
End: 2020-12-31

## 2020-12-31 RX ORDER — INSULIN GLARGINE 300 U/ML
300 INJECTION, SOLUTION SUBCUTANEOUS
Qty: 13.5 | Refills: 1 | Status: DISCONTINUED | COMMUNITY
Start: 2020-12-30 | End: 2020-12-31

## 2021-01-11 NOTE — REVIEW OF SYSTEMS
[Dry Skin] : dry skin [Negative] : Heme/Lymph [Leg Claudication] : no intermittent leg claudication [Lower Ext Edema] : no extremity edema [Skin Lesions] : no skin lesions [Skin Wound] : no skin wound [Itching] : no itching

## 2021-01-11 NOTE — HISTORY OF PRESENT ILLNESS
[FreeTextEntry1] : Location: both feet\par Duration:  1990's DM onset\par Chronic: yes\par Past Tx: never had DPM professional care\par

## 2021-01-11 NOTE — PHYSICAL EXAM
[General Appearance - Alert] : alert [General Appearance - In No Acute Distress] : in no acute distress [No Joint Swelling] : no joint swelling [] : normal strength/tone [Normal Foot/Ankle] : Both lower extremities were exposed and visualized. Standing exam demonstrates normal foot posture and alignment. Hindfoot exam shows no hindfoot valgus or varus [Skin Color & Pigmentation] : normal skin color and pigmentation [Skin Lesions] : no skin lesions [Sensation] : the sensory exam was normal to light touch and pinprick [No Focal Deficits] : no focal deficits [Deep Tendon Reflexes (DTR)] : deep tendon reflexes were 2+ and symmetric [Motor Exam] : the motor exam was normal [Oriented To Time, Place, And Person] : oriented to person, place, and time [Impaired Insight] : insight and judgment were intact [Affect] : the affect was normal [FreeTextEntry3] : The vascular exam reveals decreased pedal pulses bilateral, a capillary fill time of 3-5 seconds,  mild atrophic skin changes, mild varicosities absence of hair growth, but no cyanosis, clubbing or mottling seen. [Foot Ulcer] : no foot ulcer [Skin Induration] : no skin induration [FreeTextEntry1] : The patient has all contributing factors to onychomycosis including but not limited to thickness, subungual debris, discoloration and partial lysis and they are brittle when cut.\par  [Vibration Dec.] : normal vibratory sensation at the level of the toes [Position Sense Dec.] : normal position sense at the level of the toes [Diminished Throughout Right Foot] : normal sensation with monofilament testing throughout right foot [Diminished Throughout Left Foot] : normal sensation with monofilament testing throughout left foot

## 2021-01-11 NOTE — PROCEDURE
[FreeTextEntry1] : A lengthy and inform a discussion with the patient regarding different types of treatments for the mycotic nail disease. I stressed clinical versus mycologic cure rates and anticipated success rates regarding topical medications oral medications as well as laser therapy. I discussed in great length with the patient the success rates and success rates anticipated. There were no guarantees given regarding any of the treatment reviewed. I did explain to the patient that there are risks to oral antifungal therapy however those risks can be greatly decreased with obtaining blood tests prior and possibly during the treatment if indicated. The patient will weigh their options and contact the office\par Using sterile instrumentation debridement of all nails manually and electrically to decrease thickness, pain and girth and make shoe gear more comfortable with "slant back" procedure of any bordering spicules causing pain\par \par \par lengthy discussion with the patient regarding diabetes as well as manifestations that can occur in the feet from diabetes. The discussion included but was not limited to nail care, skin care, treatment of corn, calluses ingrown nails, blisters as well as open wound reviewed. Also reviewed was using appropriate shoe gear, as well as statistics regarding diabetes as it relates to loss of toe, multiple toes, part of the foot, the entire foot, as well as lower leg amputations. Mortality and morbidity was discussed. I also explained that 50% of diabetics who suffered loss of a leg often suffer mortality within 5 years. Educational literature dispensed, overall diabetic education as it pertains to the foot was also discussed in great length. All questions asked and answered appropriately. I have advised the patient should there be any concern regarding any part of the foot including but not limited to nails, skin, as well as shoe gear to be discussed with me immediately. Overall foot hygiene was discussed with the patient as well as daily observation of the entire foot including but not limited to the toes, the nails, the web spaces the bottom and the top of both feet. As always, I told the patient that the diabetics with concerns will be seen immediately if necessary.\par \par I have had a lengthy discussion with the patient regarding overall skincare. The importance of the type of socks, the type of shoes, and the type of overall foot hygiene is important to help control and prevent eruptions especially over extreme weather changes. This included but was not limited to hydration and lubrication, dove soap, triple rinse clothing and linens. I also explained the importance of thorough drying of both feet especially the web spaces. Given the extreme temperatures back in a car I also reviewed the type of shoes that would help reduce the chances of cracking of the skin especially leading to fissuring of the heels. Overall skincare precautions were reviewed education literature dispensed in the patient's questions asked and answered appropriately.\par \par A complete and thorough evaluation of the type of shoes they should be wearing and type of shoes for this time of year was discussed with patient.\par \par follow up appt 4 months

## 2021-01-12 ENCOUNTER — APPOINTMENT (OUTPATIENT)
Dept: INTERNAL MEDICINE | Facility: CLINIC | Age: 61
End: 2021-01-12

## 2021-02-10 ENCOUNTER — APPOINTMENT (OUTPATIENT)
Dept: ENDOCRINOLOGY | Facility: CLINIC | Age: 61
End: 2021-02-10
Payer: MEDICARE

## 2021-02-10 VITALS
SYSTOLIC BLOOD PRESSURE: 130 MMHG | HEART RATE: 59 BPM | DIASTOLIC BLOOD PRESSURE: 80 MMHG | BODY MASS INDEX: 42.66 KG/M2 | WEIGHT: 315 LBS | HEIGHT: 72 IN

## 2021-02-10 LAB — GLUCOSE BLDC GLUCOMTR-MCNC: 163

## 2021-02-10 PROCEDURE — 82962 GLUCOSE BLOOD TEST: CPT

## 2021-02-11 LAB
ESTIMATED AVERAGE GLUCOSE: 223 MG/DL
HBA1C MFR BLD HPLC: 9.4 %

## 2021-02-12 ENCOUNTER — APPOINTMENT (OUTPATIENT)
Dept: PODIATRY | Facility: CLINIC | Age: 61
End: 2021-02-12
Payer: MEDICARE

## 2021-02-12 VITALS
SYSTOLIC BLOOD PRESSURE: 121 MMHG | HEART RATE: 81 BPM | BODY MASS INDEX: 42.66 KG/M2 | HEIGHT: 72 IN | DIASTOLIC BLOOD PRESSURE: 95 MMHG | RESPIRATION RATE: 16 BRPM | OXYGEN SATURATION: 98 % | WEIGHT: 315 LBS

## 2021-02-12 DIAGNOSIS — E11.51 TYPE 2 DIABETES MELLITUS WITH DIABETIC PERIPHERAL ANGIOPATHY W/OUT GANGRENE: ICD-10-CM

## 2021-02-12 DIAGNOSIS — B35.1 TINEA UNGUIUM: ICD-10-CM

## 2021-02-12 DIAGNOSIS — E11.65 TYPE 2 DIABETES MELLITUS WITH DIABETIC PERIPHERAL ANGIOPATHY W/OUT GANGRENE: ICD-10-CM

## 2021-02-12 PROCEDURE — 11721 DEBRIDE NAIL 6 OR MORE: CPT

## 2021-02-12 NOTE — HISTORY OF PRESENT ILLNESS
[FreeTextEntry1] : Location: both feet\par Duration:  1990's DM onset\par Chronic: yes\par Past Tx: never had DPM professional care\par \par The patient presents for follow up of chronic and painful mycotic nail disease. Past professional  tx's in the presence of PAD have consisted of periodic debridements which have offered significant relief and controlling of symptoms\par

## 2021-02-12 NOTE — HISTORY OF PRESENT ILLNESS
[FreeTextEntry1] : Mr. DIPESH JOHNSON is 60 year male .\par DIPESH  was diagnosed of diabetes type 2   years back. Currently he is on  basalgar 42 untis BID \par novlog 12 units bid with two major meals, and farxiga 10 mg daily \par he is tolerating these medications well. \par Glycemia control has been  poor \par has h/o CHF ,CKD \par working with podiatry  as well \par He denies any hyperglycemic symptoms.\par He is due for an annual exam, denies any low blood sugar reactions.  He definitely can improve on his diet.  He is never seen a diabetes educator and has refused to see one today\par Last A1c on 8 October 2020 was 11.7%\par \par \par 02/10/2021- FOLLOW UP\par \par no log to review \par currently on toujeo  80 untis \par plus ISS \par farxiga 10 mg daily \par on trulicity 0.75 weekly tolerating well \par no hypoglycemia \par Review of system \par no chest pain, no palpitations \par no Shortness of breath,no wheezing. \par \par \par

## 2021-02-12 NOTE — PROCEDURE
[FreeTextEntry1] : Using sterile instrumentation debridement of all nails manually and electrically to decrease thickness, pain and girth and make shoe gear more comfortable with "slant back" procedure of any bordering spicules causing pain\par \par A complete and thorough evaluation of the type of shoes they should be wearing and type of shoes for this time of year was discussed with patient.\par \par follow up prn\par

## 2021-02-12 NOTE — PHYSICAL EXAM
[General Appearance - Alert] : alert [General Appearance - In No Acute Distress] : in no acute distress [No Joint Swelling] : no joint swelling [] : normal strength/tone [Normal Foot/Ankle] : Both lower extremities were exposed and visualized. Standing exam demonstrates normal foot posture and alignment. Hindfoot exam shows no hindfoot valgus or varus [Skin Color & Pigmentation] : normal skin color and pigmentation [Skin Lesions] : no skin lesions [Sensation] : the sensory exam was normal to light touch and pinprick [Deep Tendon Reflexes (DTR)] : deep tendon reflexes were 2+ and symmetric [No Focal Deficits] : no focal deficits [Motor Exam] : the motor exam was normal [Oriented To Time, Place, And Person] : oriented to person, place, and time [Impaired Insight] : insight and judgment were intact [Affect] : the affect was normal [FreeTextEntry3] : The vascular exam reveals decreased pedal pulses bilateral, a capillary fill time of 3-5 seconds,  mild atrophic skin changes, mild varicosities absence of hair growth, but no cyanosis, clubbing or mottling seen. [Foot Ulcer] : no foot ulcer [Skin Induration] : no skin induration [FreeTextEntry1] : The patient has all contributing factors to onychomycosis including but not limited to thickness, subungual debris, discoloration and partial lysis and they are brittle when cut.\par  [Vibration Dec.] : normal vibratory sensation at the level of the toes [Position Sense Dec.] : normal position sense at the level of the toes [Diminished Throughout Right Foot] : normal sensation with monofilament testing throughout right foot [Diminished Throughout Left Foot] : normal sensation with monofilament testing throughout left foot

## 2021-02-12 NOTE — REASON FOR VISIT
[DM Type 2] : DM Type 2 [Gestational Diabetes] : gestational diabetes [Follow - Up] : a follow-up visit

## 2021-03-01 ENCOUNTER — APPOINTMENT (OUTPATIENT)
Dept: CARDIOLOGY | Facility: CLINIC | Age: 61
End: 2021-03-01
Payer: MEDICARE

## 2021-03-01 ENCOUNTER — APPOINTMENT (OUTPATIENT)
Dept: INTERNAL MEDICINE | Facility: CLINIC | Age: 61
End: 2021-03-01
Payer: MEDICARE

## 2021-03-01 ENCOUNTER — NON-APPOINTMENT (OUTPATIENT)
Age: 61
End: 2021-03-01

## 2021-03-01 VITALS
TEMPERATURE: 98.4 F | SYSTOLIC BLOOD PRESSURE: 136 MMHG | WEIGHT: 315 LBS | BODY MASS INDEX: 42.66 KG/M2 | HEIGHT: 72 IN | DIASTOLIC BLOOD PRESSURE: 74 MMHG

## 2021-03-01 PROCEDURE — 99214 OFFICE O/P EST MOD 30 MIN: CPT

## 2021-03-01 PROCEDURE — 36415 COLL VENOUS BLD VENIPUNCTURE: CPT

## 2021-03-01 PROCEDURE — 99214 OFFICE O/P EST MOD 30 MIN: CPT | Mod: 25

## 2021-03-01 RX ORDER — SPIRONOLACTONE 25 MG/1
25 TABLET ORAL
Qty: 90 | Refills: 0 | Status: ACTIVE | COMMUNITY
Start: 2020-09-29

## 2021-03-01 RX ORDER — LOSARTAN POTASSIUM 50 MG/1
50 TABLET, FILM COATED ORAL
Qty: 90 | Refills: 0 | Status: DISCONTINUED | COMMUNITY
Start: 2020-06-29 | End: 2021-03-01

## 2021-03-01 NOTE — HISTORY OF PRESENT ILLNESS
[FreeTextEntry1] : Pt is here for second opinion of his cardiac status. \par \par Mr. DIPESH JOHNSON  is a 60 year year old M with pmhx of HFpEF, moderate aortic stenosis (cath march 2019),  s/p Medtronic defib, HTN, DM, HL, sleep apnea presents with complaints of sob on exertion. Other associated symptoms include intermittent edema. \par Pt denies symptoms of chest pain, lightheadedness, dizziness, syncope, claudication, orthopnea, PND. \par \par Patient denies history of Myocardial infarction, stroke or TIA, coronary intervention, peripheral vascular disease, aortic aneurysm or renal impairment. \par \par Resting EKG revealed normal sinus rhythm with V pacing. \par \par since last visit -  unable to tolerate treadmill, has moderate AS on echo - pseudonormalization of T waves as well as alternating bundle on minimal exertion. \par \par pt underwent cath - report not available for review however reportedly - has mild to moderate AS, 70 percent RPDA but normal FFR - no stent placed - pt doen not know medication list. \par \par

## 2021-03-01 NOTE — COUNSELING
[Fall prevention counseling provided] : Fall prevention counseling provided [Encouraged to increase physical activity] : Encouraged to increase physical activity [FreeTextEntry2] : .nutsc

## 2021-03-01 NOTE — PHYSICAL EXAM
[No JVD] : no jugular venous distention [No Edema] : there was no peripheral edema [Normal Affect] : the affect was normal [Normal Insight/Judgement] : insight and judgment were intact [Normal] : affect was normal and insight and judgment were intact [de-identified] : + PPM ACW, II/VI blowing systolic murmur [de-identified] : stasis changes in LE, but no edema currently

## 2021-03-01 NOTE — ASSESSMENT
[FreeTextEntry1] : stress echo to assess aortic valve with lumason - pt unable to obtain mphr. \par \par medtronic defib CLARIA - requires interrogation. \par \par rec dobutamine to assess aortic valve\par \par coros for inferior wall - reportedly normal\par \par pt to f/u in 2 weeks for medication update and defib interogation. \par eventual change losartan and lasix to entresto. \par \par total time 30 min.

## 2021-03-01 NOTE — REVIEW OF SYSTEMS
[Negative] : Heme/Lymph [Fever] : no fever [Fatigue] : no fatigue [de-identified] : feeling a bit down over the pandemic but not depressed

## 2021-03-01 NOTE — HEALTH RISK ASSESSMENT
[No] : In the past 12 months have you used drugs other than those required for medical reasons? No [No falls in past year] : Patient reported no falls in the past year [0] : 2) Feeling down, depressed, or hopeless: Not at all (0) [Patient reported colonoscopy was normal] : Patient reported colonoscopy was normal [HIV Test offered] : HIV Test offered [Hepatitis C test offered] : Hepatitis C test offered [None] : None [With Significant Other] : lives with significant other [On disability] : on disability [] :  [Feels Safe at Home] : Feels safe at home [Fully functional (bathing, dressing, toileting, transferring, walking, feeding)] : Fully functional (bathing, dressing, toileting, transferring, walking, feeding) [Fully functional (using the telephone, shopping, preparing meals, housekeeping, doing laundry, using] : Fully functional and needs no help or supervision to perform IADLs (using the telephone, shopping, preparing meals, housekeeping, doing laundry, using transportation, managing medications and managing finances) [] : No [de-identified] : walking  [de-identified] : regular diet  [ColonoscopyDate] : 01/18 [ColonoscopyComments] : normal as per pt, in [place]

## 2021-03-02 LAB
BASOPHILS # BLD AUTO: 0.07 K/UL
BASOPHILS NFR BLD AUTO: 1 %
EOSINOPHIL # BLD AUTO: 0.17 K/UL
EOSINOPHIL NFR BLD AUTO: 2.4 %
HCT VFR BLD CALC: 48.3 %
HGB BLD-MCNC: 14.7 G/DL
IMM GRANULOCYTES NFR BLD AUTO: 0.3 %
LYMPHOCYTES # BLD AUTO: 1.23 K/UL
LYMPHOCYTES NFR BLD AUTO: 17 %
MAN DIFF?: NORMAL
MCHC RBC-ENTMCNC: 25.1 PG
MCHC RBC-ENTMCNC: 30.4 GM/DL
MCV RBC AUTO: 82.4 FL
MONOCYTES # BLD AUTO: 0.7 K/UL
MONOCYTES NFR BLD AUTO: 9.7 %
NEUTROPHILS # BLD AUTO: 5.03 K/UL
NEUTROPHILS NFR BLD AUTO: 69.6 %
PLATELET # BLD AUTO: 226 K/UL
RBC # BLD: 5.86 M/UL
RBC # FLD: 15.2 %
WBC # FLD AUTO: 7.22 K/UL

## 2021-03-05 LAB
ALBUMIN SERPL ELPH-MCNC: 4 G/DL
ALP BLD-CCNC: 71 U/L
ALT SERPL-CCNC: 6 U/L
ANION GAP SERPL CALC-SCNC: 12 MMOL/L
AST SERPL-CCNC: 13 U/L
BILIRUB SERPL-MCNC: 0.5 MG/DL
BUN SERPL-MCNC: 29 MG/DL
CALCIUM SERPL-MCNC: 9.2 MG/DL
CHLORIDE SERPL-SCNC: 105 MMOL/L
CHOLEST SERPL-MCNC: 197 MG/DL
CO2 SERPL-SCNC: 25 MMOL/L
CREAT SERPL-MCNC: 1.79 MG/DL
GLUCOSE SERPL-MCNC: 203 MG/DL
HDLC SERPL-MCNC: 36 MG/DL
LDLC SERPL CALC-MCNC: 141 MG/DL
NONHDLC SERPL-MCNC: 161 MG/DL
POTASSIUM SERPL-SCNC: 4.5 MMOL/L
PROT SERPL-MCNC: 7.1 G/DL
SODIUM SERPL-SCNC: 142 MMOL/L
TRIGL SERPL-MCNC: 96 MG/DL

## 2021-03-15 ENCOUNTER — APPOINTMENT (OUTPATIENT)
Dept: CARDIOLOGY | Facility: CLINIC | Age: 61
End: 2021-03-15
Payer: MEDICARE

## 2021-03-15 VITALS
HEIGHT: 72 IN | BODY MASS INDEX: 42.66 KG/M2 | DIASTOLIC BLOOD PRESSURE: 70 MMHG | WEIGHT: 315 LBS | SYSTOLIC BLOOD PRESSURE: 150 MMHG

## 2021-03-15 PROCEDURE — 93284 PRGRMG EVAL IMPLANTABLE DFB: CPT

## 2021-03-15 PROCEDURE — 99214 OFFICE O/P EST MOD 30 MIN: CPT | Mod: 25

## 2021-03-15 RX ORDER — SIMVASTATIN 20 MG/1
20 TABLET, FILM COATED ORAL DAILY
Refills: 0 | Status: ACTIVE | COMMUNITY

## 2021-03-15 RX ORDER — AMMONIUM LACTATE 12 %
12 CREAM (GRAM) TOPICAL TWICE DAILY
Qty: 1 | Refills: 3 | Status: DISCONTINUED | COMMUNITY
Start: 2019-12-17 | End: 2021-03-15

## 2021-03-15 NOTE — HISTORY OF PRESENT ILLNESS
[FreeTextEntry1] : Pt is here for second opinion of his cardiac status. \par \par Mr. DIPESH JOHNSON  is a 60 year year old M with pmhx of HFpEF, moderate Biscupid aortic stenosis (cath nov 2020),  s/p Medtronic defib ([facility]) , HTN, DM, HL, sleep apnea \par Pt denies symptoms of chest pain, lightheadedness, dizziness, syncope, claudication, orthopnea, PND. \par \par Resting EKG revealed normal sinus rhythm with V pacing. \par \par cath [facility]  has mild to moderate AS, 70 percent RPDA but normal FFR - no stent placed -\par \par   unable to tolerate treadmill, has moderate AS on echo - pseudonormalization of T waves as well as alternating bundle on minimal exertion. \par \par since last visit - Has shortness of breath\par defib interrogated - few NSVT - asymptomatic. \par \par \par \par

## 2021-03-15 NOTE — ASSESSMENT
[FreeTextEntry1] : suboptimal stress echo to assess aortic valve with lumason - pt unable to obtain mphr. \par EF 53% ? INFERIOR/LATERAL HK - moderate LVH 1.3 cm. \par cath - distal disease\par start asa 81 mg\par \par medtronic defib CLARIA - requires interrogation. \par \par rec dobutamine to assess aortic valve\par \par \par defib interogation. optival with fluid acuumulation dec - improved\par thresholds wnl\par no significant events - few runs of NSVT. \par \par \par total time 30 min.

## 2021-04-13 ENCOUNTER — APPOINTMENT (OUTPATIENT)
Dept: RHEUMATOLOGY | Facility: CLINIC | Age: 61
End: 2021-04-13

## 2021-04-23 ENCOUNTER — APPOINTMENT (OUTPATIENT)
Dept: PODIATRY | Facility: CLINIC | Age: 61
End: 2021-04-23

## 2021-04-26 ENCOUNTER — NON-APPOINTMENT (OUTPATIENT)
Age: 61
End: 2021-04-26

## 2021-05-03 ENCOUNTER — NON-APPOINTMENT (OUTPATIENT)
Age: 61
End: 2021-05-03

## 2021-05-03 ENCOUNTER — APPOINTMENT (OUTPATIENT)
Dept: CARDIOLOGY | Facility: CLINIC | Age: 61
End: 2021-05-03
Payer: MEDICARE

## 2021-05-03 VITALS
SYSTOLIC BLOOD PRESSURE: 100 MMHG | WEIGHT: 315 LBS | BODY MASS INDEX: 42.66 KG/M2 | HEIGHT: 72 IN | DIASTOLIC BLOOD PRESSURE: 80 MMHG

## 2021-05-03 PROCEDURE — 99214 OFFICE O/P EST MOD 30 MIN: CPT | Mod: 25

## 2021-05-03 PROCEDURE — 93000 ELECTROCARDIOGRAM COMPLETE: CPT

## 2021-05-03 NOTE — ASSESSMENT
[FreeTextEntry1] : EKG \par May 2021 - NSR Vpaced. \par \par suboptimal stress echo to assess aortic valve with lumason - pt unable to obtain mphr. \par EF 53% ? INFERIOR/LATERAL HK - moderate LVH 1.3 cm. \par cath - distal disease\par start asa 81 mg\par \par medtronic defib CLARIA - requires interrogation. \par \par defib interogation. optival with fluid accumulation dec - improved\par thresholds wnl\par no significant events - few runs of NSVT. \par \par CHF counseling. low salt diet\par pt to call in his meds\par interrogation of device at next visit\par consider nitro prn if chest pain. will rx at next visit if symptoms persist. \par \par total time 4 weeks

## 2021-05-03 NOTE — HISTORY OF PRESENT ILLNESS
[FreeTextEntry1] : Pt is here for second opinion of his cardiac status. \par \par Mr. DIPESH JOHNSON  is a 60 year year old M with pmhx of HFpEF, moderate Biscupid aortic stenosis (cath nov \par \par \par cath [facility]  has mild to moderate AS, 70 percent RPDA but normal FFR - no stent placed -\par \par   unable to tolerate treadmill, has moderate AS on echo - pseudonormalization of T waves as well as alternating bundle on minimal exertion. \par \par defib interrogated - few NSVT - asymptomatic. \par \par since last visit -\par \par Went to hospital aprial 2021 - with sob and chest discomfort and leg edema. diuresed well and right sdied chest pain has improved . pt has lowered salt in his diet - does not know his meds. \par Resting EKG revealed normal sinus rhythm with V pacing. \par \par \par \par \par

## 2021-05-03 NOTE — PHYSICAL EXAM
[Well Developed] : well developed [Well Nourished] : well nourished [No Acute Distress] : no acute distress [Normal Conjunctiva] : normal conjunctiva [Normal Venous Pressure] : normal venous pressure [No Carotid Bruit] : no carotid bruit [Normal S1, S2] : normal S1, S2 [No Rub] : no rub [No Gallop] : no gallop [Clear Lung Fields] : clear lung fields [Good Air Entry] : good air entry [No Respiratory Distress] : no respiratory distress  [Soft] : abdomen soft [Non Tender] : non-tender [No Masses/organomegaly] : no masses/organomegaly [Normal Bowel Sounds] : normal bowel sounds [Normal Gait] : normal gait [No Edema] : no edema [No Cyanosis] : no cyanosis [No Clubbing] : no clubbing [No Varicosities] : no varicosities [No Rash] : no rash [No Skin Lesions] : no skin lesions [Moves all extremities] : moves all extremities [No Focal Deficits] : no focal deficits [Normal Speech] : normal speech [Alert and Oriented] : alert and oriented [Normal memory] : normal memory [de-identified] : obese [de-identified] : 3/6 SM

## 2021-05-14 ENCOUNTER — APPOINTMENT (OUTPATIENT)
Dept: ENDOCRINOLOGY | Facility: CLINIC | Age: 61
End: 2021-05-14
Payer: MEDICARE

## 2021-05-14 VITALS
OXYGEN SATURATION: 96 % | HEART RATE: 61 BPM | HEIGHT: 72 IN | DIASTOLIC BLOOD PRESSURE: 80 MMHG | BODY MASS INDEX: 42.66 KG/M2 | WEIGHT: 315 LBS | SYSTOLIC BLOOD PRESSURE: 130 MMHG

## 2021-05-14 LAB — GLUCOSE BLDC GLUCOMTR-MCNC: 263

## 2021-05-14 PROCEDURE — 99214 OFFICE O/P EST MOD 30 MIN: CPT | Mod: 25

## 2021-05-14 PROCEDURE — 82962 GLUCOSE BLOOD TEST: CPT

## 2021-05-14 NOTE — HISTORY OF PRESENT ILLNESS
[FreeTextEntry1] : Mr. DIPESH JOHNSON is 60 year male .\par DIPESH  was diagnosed of diabetes type 2   years back. Currently he is on  basalgar 42 untis BID \par novlog 12 units bid with two major meals, and farxiga 10 mg daily \par he is tolerating these medications well. \par Glycemia control has been  poor \par has h/o CHF ,CKD \par working with podiatry  as well \par He denies any hyperglycemic symptoms.\par He is due for an annual exam, denies any low blood sugar reactions.  He definitely can improve on his diet.  He is never seen a diabetes educator and has refused to see one today\par Last A1c on 8 October 2020 was 11.7%\par \par \par 02/10/2021- FOLLOW UP\par \par no log to review \par currently on toujeo  80 untis \par plus ISS \par farxiga 10 mg daily \par on trulicity 0.75 weekly tolerating well \par no hypoglycemia \par Review of system \par no chest pain, no palpitations \par no Shortness of breath,no wheezing. \par \par \par 05/14/2021- FOLLOW UP\par currently on toujeo  80 untis \par plus novolog at dinner time \par farxiga 10 mg daily \par on trulicity 1.5 weekly tolerating well \par no logs to review today \par had ? pleural effusion\par no hypoglycemia \par

## 2021-05-18 LAB
ALBUMIN SERPL ELPH-MCNC: 4.2 G/DL
ALBUMIN SERPL ELPH-MCNC: 4.2 G/DL
ALP BLD-CCNC: 77 U/L
ALT SERPL-CCNC: 8 U/L
ANION GAP SERPL CALC-SCNC: 10 MMOL/L
AST SERPL-CCNC: 11 U/L
BILIRUB DIRECT SERPL-MCNC: 0.1 MG/DL
BILIRUB INDIRECT SERPL-MCNC: 0.2 MG/DL
BILIRUB SERPL-MCNC: 0.3 MG/DL
BUN SERPL-MCNC: 25 MG/DL
CALCIUM SERPL-MCNC: 9.4 MG/DL
CHLORIDE SERPL-SCNC: 105 MMOL/L
CO2 SERPL-SCNC: 29 MMOL/L
CREAT SERPL-MCNC: 1.77 MG/DL
CREAT SPEC-SCNC: 64 MG/DL
ESTIMATED AVERAGE GLUCOSE: 177 MG/DL
GLUCOSE SERPL-MCNC: 263 MG/DL
HBA1C MFR BLD HPLC: 7.8 %
MICROALBUMIN 24H UR DL<=1MG/L-MCNC: 5.5 MG/DL
MICROALBUMIN/CREAT 24H UR-RTO: 87 MG/G
PHOSPHATE SERPL-MCNC: 3.7 MG/DL
POTASSIUM SERPL-SCNC: 4.6 MMOL/L
PROT SERPL-MCNC: 7 G/DL
SODIUM SERPL-SCNC: 143 MMOL/L
TSH SERPL-ACNC: 2.84 UIU/ML

## 2021-05-18 RX ORDER — DAPAGLIFLOZIN 10 MG/1
10 TABLET, FILM COATED ORAL DAILY
Qty: 90 | Refills: 1 | Status: DISCONTINUED | COMMUNITY
Start: 1900-01-01 | End: 2021-05-18

## 2021-06-02 ENCOUNTER — APPOINTMENT (OUTPATIENT)
Dept: ENDOCRINOLOGY | Facility: CLINIC | Age: 61
End: 2021-06-02
Payer: MEDICARE

## 2021-06-02 ENCOUNTER — APPOINTMENT (OUTPATIENT)
Dept: INTERNAL MEDICINE | Facility: CLINIC | Age: 61
End: 2021-06-02

## 2021-06-02 PROCEDURE — 95251 CONT GLUC MNTR ANALYSIS I&R: CPT

## 2021-06-02 PROCEDURE — 95250 CONT GLUC MNTR PHYS/QHP EQP: CPT

## 2021-06-07 ENCOUNTER — APPOINTMENT (OUTPATIENT)
Dept: CARDIOLOGY | Facility: CLINIC | Age: 61
End: 2021-06-07
Payer: MEDICARE

## 2021-06-07 VITALS
WEIGHT: 315 LBS | DIASTOLIC BLOOD PRESSURE: 80 MMHG | BODY MASS INDEX: 42.66 KG/M2 | SYSTOLIC BLOOD PRESSURE: 130 MMHG | HEIGHT: 72 IN

## 2021-06-07 PROCEDURE — 99213 OFFICE O/P EST LOW 20 MIN: CPT

## 2021-06-07 NOTE — HISTORY OF PRESENT ILLNESS
[FreeTextEntry1] : Pt is here for second opinion of his cardiac status. \par \par Mr. DIPESH JOHNSON  is a 60 year year old M with pmhx of HFpEF, moderate Bicuspid aortic stenosis (cath nov \par \par cath [facility]  has mild to moderate AS, 70 percent RPDA but normal FFR - no stent placed -\par \par   unable to tolerate treadmill, has moderate AS on echo - pseudonormalization of T waves as well as alternating bundle on minimal exertion. \par \par defib interrogated - few NSVT - asymptomatic. \par \par since last visit - has cough - no fevers - no orthopnea - no edema. Pt able to walk dog around block withouth chest pain or sob. \par \par \par \par

## 2021-06-07 NOTE — ASSESSMENT
[FreeTextEntry1] : EKG \par May 2021 - NSR Vpaced. \par \par suboptimal stress echo to assess aortic valve with lumason - pt unable to obtain mphr. \par EF 53% ? INFERIOR/LATERAL HK - moderate LVH 1.3 cm. \par cath - distal disease\par start asa 81 mg\par \par Medtronic defib CLARIA - requires interrogation. \par \par defib interrogation. optival with fluid accumulation dec - improved\par thresholds wnl\par no significant events - few runs of NSVT. \par \par cough - Tessalon pearls. no obvious evidence of fluid overload. \par \par total time 3 months

## 2021-06-07 NOTE — PHYSICAL EXAM
[Well Developed] : well developed [Well Nourished] : well nourished [No Acute Distress] : no acute distress [Normal Conjunctiva] : normal conjunctiva [Normal Venous Pressure] : normal venous pressure [No Carotid Bruit] : no carotid bruit [Normal S1, S2] : normal S1, S2 [No Rub] : no rub [No Gallop] : no gallop [Clear Lung Fields] : clear lung fields [Good Air Entry] : good air entry [No Respiratory Distress] : no respiratory distress  [Soft] : abdomen soft [Non Tender] : non-tender [No Masses/organomegaly] : no masses/organomegaly [Normal Bowel Sounds] : normal bowel sounds [Normal Gait] : normal gait [No Edema] : no edema [No Cyanosis] : no cyanosis [No Clubbing] : no clubbing [No Varicosities] : no varicosities [No Rash] : no rash [No Skin Lesions] : no skin lesions [Moves all extremities] : moves all extremities [No Focal Deficits] : no focal deficits [Normal Speech] : normal speech [Alert and Oriented] : alert and oriented [Normal memory] : normal memory [de-identified] : obese [de-identified] : 3/6 SM

## 2021-06-09 ENCOUNTER — APPOINTMENT (OUTPATIENT)
Dept: RHEUMATOLOGY | Facility: CLINIC | Age: 61
End: 2021-06-09
Payer: MEDICARE

## 2021-06-09 VITALS
DIASTOLIC BLOOD PRESSURE: 80 MMHG | HEIGHT: 72 IN | WEIGHT: 315 LBS | TEMPERATURE: 97.6 F | BODY MASS INDEX: 42.66 KG/M2 | SYSTOLIC BLOOD PRESSURE: 134 MMHG

## 2021-06-09 DIAGNOSIS — M25.50 PAIN IN UNSPECIFIED JOINT: ICD-10-CM

## 2021-06-09 DIAGNOSIS — E55.9 VITAMIN D DEFICIENCY, UNSPECIFIED: ICD-10-CM

## 2021-06-09 DIAGNOSIS — G89.29 PAIN IN UNSPECIFIED JOINT: ICD-10-CM

## 2021-06-09 DIAGNOSIS — M02.30 REITER'S DISEASE, UNSPECIFIED SITE: ICD-10-CM

## 2021-06-09 DIAGNOSIS — M79.7 FIBROMYALGIA: ICD-10-CM

## 2021-06-09 PROCEDURE — 99213 OFFICE O/P EST LOW 20 MIN: CPT

## 2021-06-14 ENCOUNTER — APPOINTMENT (OUTPATIENT)
Dept: CARDIOLOGY | Facility: CLINIC | Age: 61
End: 2021-06-14

## 2021-06-14 PROBLEM — E55.9 VITAMIN D DEFICIENCY: Status: ACTIVE | Noted: 2020-04-03

## 2021-06-14 PROBLEM — M02.30 REACTIVE ARTHRITIS: Status: ACTIVE | Noted: 2020-03-13

## 2021-06-14 PROBLEM — M79.7 FIBROMYALGIA: Status: ACTIVE | Noted: 2021-06-14

## 2021-06-14 NOTE — HISTORY OF PRESENT ILLNESS
[FreeTextEntry1] : Was hospitalized recently with fluid in his legs and lungs.  \par He feels good.  He takes his dogs for a walk.

## 2021-06-15 ENCOUNTER — RX RENEWAL (OUTPATIENT)
Age: 61
End: 2021-06-15

## 2021-07-13 ENCOUNTER — APPOINTMENT (OUTPATIENT)
Dept: INTERNAL MEDICINE | Facility: CLINIC | Age: 61
End: 2021-07-13

## 2021-07-26 ENCOUNTER — RX RENEWAL (OUTPATIENT)
Age: 61
End: 2021-07-26

## 2021-08-11 ENCOUNTER — APPOINTMENT (OUTPATIENT)
Dept: ENDOCRINOLOGY | Facility: CLINIC | Age: 61
End: 2021-08-11
Payer: MEDICARE

## 2021-08-11 DIAGNOSIS — E11.49 TYPE 2 DIABETES MELLITUS WITH OTHER DIABETIC NEUROLOGICAL COMPLICATION: ICD-10-CM

## 2021-08-11 DIAGNOSIS — Z79.4 TYPE 2 DIABETES MELLITUS WITH OTHER DIABETIC NEUROLOGICAL COMPLICATION: ICD-10-CM

## 2021-08-11 PROCEDURE — 99214 OFFICE O/P EST MOD 30 MIN: CPT | Mod: 95

## 2021-08-11 RX ORDER — INSULIN GLARGINE 300 U/ML
300 INJECTION, SOLUTION SUBCUTANEOUS
Qty: 3 | Refills: 1 | Status: DISCONTINUED | COMMUNITY
Start: 2020-12-31 | End: 2021-08-11

## 2021-08-11 NOTE — HISTORY OF PRESENT ILLNESS
[Home] : at home, [unfilled] , at the time of the visit. [Medical Office: ([facility])___] : at the medical office located in  [Verbal consent obtained from patient] : the patient, [unfilled] [FreeTextEntry1] : Mr. DIPESH JOHNSON is 60 year male .\par DIPESH  was diagnosed of diabetes type 2   years back. Currently he is on  basalgar 42 untis BID \par novlog 12 units bid with two major meals, and farxiga 10 mg daily \par he is tolerating these medications well. \par Glycemia control has been  poor \par has h/o CHF ,CKD \par working with podiatry  as well \par He denies any hyperglycemic symptoms.\par He is due for an annual exam, denies any low blood sugar reactions.  He definitely can improve on his diet.  He is never seen a diabetes educator and has refused to see one today\par Last A1c on 8 October 2020 was 11.7%\par \par \par 02/10/2021- FOLLOW UP\par \par no log to review \par currently on toujeo  80 untis \par plus ISS \par farxiga 10 mg daily \par on trulicity 0.75 weekly tolerating well \par no hypoglycemia \par Review of system \par no chest pain, no palpitations \par no Shortness of breath,no wheezing. \par \par \par 05/14/2021- FOLLOW UP\par currently on toujeo  80 untis \par plus novolog at dinner time \par farxiga 10 mg daily \par on trulicity 1.5 weekly tolerating well \par no logs to review today \par had ? pleural effusion\par no hypoglycemia \par \par \par 08/11/2021- FOLLOW UP\par Patient continues to take glargine 80 units at bedtime \par Very minimal sliding scale.  He continues to take Trulicity 3 mg weekly.  No logs to review.  He has had visual concerns and is closely following with an ophthalmologist.  He got approval for Dexcom but he has not started using it.

## 2021-09-02 ENCOUNTER — RESULT REVIEW (OUTPATIENT)
Age: 61
End: 2021-09-02

## 2021-09-03 ENCOUNTER — INPATIENT (INPATIENT)
Facility: HOSPITAL | Age: 61
LOS: 4 days | Discharge: HOME | DRG: 280 | End: 2021-09-08
Attending: INTERNAL MEDICINE | Admitting: INTERNAL MEDICINE
Payer: MEDICARE

## 2021-09-03 ENCOUNTER — NON-APPOINTMENT (OUTPATIENT)
Age: 61
End: 2021-09-03

## 2021-09-03 VITALS
TEMPERATURE: 98 F | HEART RATE: 67 BPM | DIASTOLIC BLOOD PRESSURE: 70 MMHG | OXYGEN SATURATION: 97 % | RESPIRATION RATE: 16 BRPM | HEIGHT: 72 IN | SYSTOLIC BLOOD PRESSURE: 140 MMHG | WEIGHT: 315 LBS

## 2021-09-03 DIAGNOSIS — N18.30 CHRONIC KIDNEY DISEASE, STAGE 3 UNSPECIFIED: ICD-10-CM

## 2021-09-03 DIAGNOSIS — I35.0 NONRHEUMATIC AORTIC (VALVE) STENOSIS: ICD-10-CM

## 2021-09-03 DIAGNOSIS — E78.5 HYPERLIPIDEMIA, UNSPECIFIED: ICD-10-CM

## 2021-09-03 DIAGNOSIS — I25.10 ATHEROSCLEROTIC HEART DISEASE OF NATIVE CORONARY ARTERY WITHOUT ANGINA PECTORIS: ICD-10-CM

## 2021-09-03 DIAGNOSIS — I10 ESSENTIAL (PRIMARY) HYPERTENSION: ICD-10-CM

## 2021-09-03 DIAGNOSIS — E11.9 TYPE 2 DIABETES MELLITUS WITHOUT COMPLICATIONS: ICD-10-CM

## 2021-09-03 DIAGNOSIS — I50.43 ACUTE ON CHRONIC COMBINED SYSTOLIC (CONGESTIVE) AND DIASTOLIC (CONGESTIVE) HEART FAILURE: ICD-10-CM

## 2021-09-03 DIAGNOSIS — Z90.49 ACQUIRED ABSENCE OF OTHER SPECIFIED PARTS OF DIGESTIVE TRACT: Chronic | ICD-10-CM

## 2021-09-03 DIAGNOSIS — G47.33 OBSTRUCTIVE SLEEP APNEA (ADULT) (PEDIATRIC): ICD-10-CM

## 2021-09-03 LAB
A1C WITH ESTIMATED AVERAGE GLUCOSE RESULT: 6.9 % — HIGH (ref 4–5.6)
ALBUMIN SERPL ELPH-MCNC: 3.6 G/DL — SIGNIFICANT CHANGE UP (ref 3.3–5)
ALP SERPL-CCNC: 57 U/L — SIGNIFICANT CHANGE UP (ref 40–120)
ALT FLD-CCNC: 15 U/L — SIGNIFICANT CHANGE UP (ref 10–45)
ANION GAP SERPL CALC-SCNC: 8 MMOL/L — SIGNIFICANT CHANGE UP (ref 5–17)
AST SERPL-CCNC: 19 U/L — SIGNIFICANT CHANGE UP (ref 10–40)
BILIRUB SERPL-MCNC: 1.4 MG/DL — HIGH (ref 0.2–1.2)
BUN SERPL-MCNC: 21 MG/DL — SIGNIFICANT CHANGE UP (ref 7–23)
CALCIUM SERPL-MCNC: 8.7 MG/DL — SIGNIFICANT CHANGE UP (ref 8.4–10.5)
CHLORIDE SERPL-SCNC: 105 MMOL/L — SIGNIFICANT CHANGE UP (ref 96–108)
CK MB CFR SERPL CALC: 4.6 NG/ML — SIGNIFICANT CHANGE UP (ref 0–6.7)
CK MB CFR SERPL CALC: 5.1 NG/ML — SIGNIFICANT CHANGE UP (ref 0–6.7)
CK SERPL-CCNC: 131 U/L — SIGNIFICANT CHANGE UP (ref 30–200)
CK SERPL-CCNC: 146 U/L — SIGNIFICANT CHANGE UP (ref 30–200)
CO2 SERPL-SCNC: 28 MMOL/L — SIGNIFICANT CHANGE UP (ref 22–31)
CREAT SERPL-MCNC: 1.38 MG/DL — HIGH (ref 0.5–1.3)
ESTIMATED AVERAGE GLUCOSE: 151 MG/DL — HIGH (ref 68–114)
GLUCOSE BLDC GLUCOMTR-MCNC: 209 MG/DL — HIGH (ref 70–99)
GLUCOSE SERPL-MCNC: 148 MG/DL — HIGH (ref 70–99)
MAGNESIUM SERPL-MCNC: 1.7 MG/DL — SIGNIFICANT CHANGE UP (ref 1.6–2.6)
NT-PROBNP SERPL-SCNC: 5194 PG/ML — HIGH (ref 0–300)
POTASSIUM SERPL-MCNC: 4 MMOL/L — SIGNIFICANT CHANGE UP (ref 3.5–5.3)
POTASSIUM SERPL-SCNC: 4 MMOL/L — SIGNIFICANT CHANGE UP (ref 3.5–5.3)
PROT SERPL-MCNC: 6.9 G/DL — SIGNIFICANT CHANGE UP (ref 6–8.3)
SODIUM SERPL-SCNC: 141 MMOL/L — SIGNIFICANT CHANGE UP (ref 135–145)
TROPONIN T SERPL-MCNC: 0.3 NG/ML — CRITICAL HIGH (ref 0–0.01)
TROPONIN T SERPL-MCNC: 0.3 NG/ML — CRITICAL HIGH (ref 0–0.01)
TSH SERPL-MCNC: 0.91 UIU/ML — SIGNIFICANT CHANGE UP (ref 0.27–4.2)

## 2021-09-03 PROCEDURE — 71045 X-RAY EXAM CHEST 1 VIEW: CPT | Mod: 26

## 2021-09-03 RX ORDER — GLUCAGON INJECTION, SOLUTION 0.5 MG/.1ML
1 INJECTION, SOLUTION SUBCUTANEOUS ONCE
Refills: 0 | Status: DISCONTINUED | OUTPATIENT
Start: 2021-09-03 | End: 2021-09-08

## 2021-09-03 RX ORDER — IPRATROPIUM/ALBUTEROL SULFATE 18-103MCG
3 AEROSOL WITH ADAPTER (GRAM) INHALATION ONCE
Refills: 0 | Status: COMPLETED | OUTPATIENT
Start: 2021-09-03 | End: 2021-09-03

## 2021-09-03 RX ORDER — DEXTROSE 50 % IN WATER 50 %
25 SYRINGE (ML) INTRAVENOUS ONCE
Refills: 0 | Status: DISCONTINUED | OUTPATIENT
Start: 2021-09-03 | End: 2021-09-08

## 2021-09-03 RX ORDER — DULOXETINE HYDROCHLORIDE 30 MG/1
1 CAPSULE, DELAYED RELEASE ORAL
Qty: 0 | Refills: 0 | DISCHARGE

## 2021-09-03 RX ORDER — INSULIN GLARGINE 100 [IU]/ML
80 INJECTION, SOLUTION SUBCUTANEOUS
Qty: 0 | Refills: 0 | DISCHARGE

## 2021-09-03 RX ORDER — ASPIRIN/CALCIUM CARB/MAGNESIUM 324 MG
325 TABLET ORAL ONCE
Refills: 0 | Status: COMPLETED | OUTPATIENT
Start: 2021-09-03 | End: 2021-09-03

## 2021-09-03 RX ORDER — INSULIN DEGLUDEC 100 U/ML
12 INJECTION, SOLUTION SUBCUTANEOUS
Qty: 0 | Refills: 0 | DISCHARGE

## 2021-09-03 RX ORDER — DULOXETINE HYDROCHLORIDE 30 MG/1
60 CAPSULE, DELAYED RELEASE ORAL DAILY
Refills: 0 | Status: DISCONTINUED | OUTPATIENT
Start: 2021-09-03 | End: 2021-09-08

## 2021-09-03 RX ORDER — INSULIN LISPRO 100/ML
VIAL (ML) SUBCUTANEOUS
Refills: 0 | Status: DISCONTINUED | OUTPATIENT
Start: 2021-09-03 | End: 2021-09-08

## 2021-09-03 RX ORDER — CYCLOBENZAPRINE HYDROCHLORIDE 10 MG/1
5 TABLET, FILM COATED ORAL THREE TIMES A DAY
Refills: 0 | Status: DISCONTINUED | OUTPATIENT
Start: 2021-09-03 | End: 2021-09-08

## 2021-09-03 RX ORDER — FUROSEMIDE 40 MG
40 TABLET ORAL ONCE
Refills: 0 | Status: COMPLETED | OUTPATIENT
Start: 2021-09-03 | End: 2021-09-03

## 2021-09-03 RX ORDER — INFLUENZA VIRUS VACCINE 15; 15; 15; 15 UG/.5ML; UG/.5ML; UG/.5ML; UG/.5ML
0.5 SUSPENSION INTRAMUSCULAR ONCE
Refills: 0 | Status: COMPLETED | OUTPATIENT
Start: 2021-09-03 | End: 2021-09-03

## 2021-09-03 RX ORDER — DEXTROSE 50 % IN WATER 50 %
12.5 SYRINGE (ML) INTRAVENOUS ONCE
Refills: 0 | Status: DISCONTINUED | OUTPATIENT
Start: 2021-09-03 | End: 2021-09-08

## 2021-09-03 RX ORDER — MAGNESIUM SULFATE 500 MG/ML
2 VIAL (ML) INJECTION ONCE
Refills: 0 | Status: COMPLETED | OUTPATIENT
Start: 2021-09-03 | End: 2021-09-03

## 2021-09-03 RX ORDER — SODIUM CHLORIDE 9 MG/ML
1000 INJECTION, SOLUTION INTRAVENOUS
Refills: 0 | Status: DISCONTINUED | OUTPATIENT
Start: 2021-09-03 | End: 2021-09-08

## 2021-09-03 RX ORDER — INSULIN GLARGINE 100 [IU]/ML
45 INJECTION, SOLUTION SUBCUTANEOUS AT BEDTIME
Refills: 0 | Status: DISCONTINUED | OUTPATIENT
Start: 2021-09-03 | End: 2021-09-08

## 2021-09-03 RX ORDER — ATORVASTATIN CALCIUM 80 MG/1
80 TABLET, FILM COATED ORAL AT BEDTIME
Refills: 0 | Status: DISCONTINUED | OUTPATIENT
Start: 2021-09-04 | End: 2021-09-08

## 2021-09-03 RX ORDER — DULAGLUTIDE 4.5 MG/.5ML
0 INJECTION, SOLUTION SUBCUTANEOUS
Qty: 0 | Refills: 0 | DISCHARGE

## 2021-09-03 RX ORDER — SACUBITRIL AND VALSARTAN 24; 26 MG/1; MG/1
1 TABLET, FILM COATED ORAL
Refills: 0 | Status: DISCONTINUED | OUTPATIENT
Start: 2021-09-03 | End: 2021-09-05

## 2021-09-03 RX ORDER — CYCLOBENZAPRINE HYDROCHLORIDE 10 MG/1
1 TABLET, FILM COATED ORAL
Qty: 0 | Refills: 0 | DISCHARGE

## 2021-09-03 RX ORDER — CARVEDILOL PHOSPHATE 80 MG/1
1 CAPSULE, EXTENDED RELEASE ORAL
Qty: 0 | Refills: 0 | DISCHARGE

## 2021-09-03 RX ORDER — CARVEDILOL PHOSPHATE 80 MG/1
25 CAPSULE, EXTENDED RELEASE ORAL EVERY 12 HOURS
Refills: 0 | Status: DISCONTINUED | OUTPATIENT
Start: 2021-09-03 | End: 2021-09-08

## 2021-09-03 RX ORDER — INSULIN LISPRO 100/ML
15 VIAL (ML) SUBCUTANEOUS
Refills: 0 | Status: DISCONTINUED | OUTPATIENT
Start: 2021-09-03 | End: 2021-09-08

## 2021-09-03 RX ORDER — PANTOPRAZOLE SODIUM 20 MG/1
40 TABLET, DELAYED RELEASE ORAL
Refills: 0 | Status: DISCONTINUED | OUTPATIENT
Start: 2021-09-03 | End: 2021-09-08

## 2021-09-03 RX ORDER — ENOXAPARIN SODIUM 100 MG/ML
40 INJECTION SUBCUTANEOUS EVERY 12 HOURS
Refills: 0 | Status: DISCONTINUED | OUTPATIENT
Start: 2021-09-03 | End: 2021-09-04

## 2021-09-03 RX ORDER — ATORVASTATIN CALCIUM 80 MG/1
80 TABLET, FILM COATED ORAL ONCE
Refills: 0 | Status: COMPLETED | OUTPATIENT
Start: 2021-09-03 | End: 2021-09-03

## 2021-09-03 RX ORDER — DEXTROSE 50 % IN WATER 50 %
15 SYRINGE (ML) INTRAVENOUS ONCE
Refills: 0 | Status: DISCONTINUED | OUTPATIENT
Start: 2021-09-03 | End: 2021-09-08

## 2021-09-03 RX ORDER — ASPIRIN/CALCIUM CARB/MAGNESIUM 324 MG
81 TABLET ORAL DAILY
Refills: 0 | Status: DISCONTINUED | OUTPATIENT
Start: 2021-09-04 | End: 2021-09-08

## 2021-09-03 RX ADMIN — CARVEDILOL PHOSPHATE 25 MILLIGRAM(S): 80 CAPSULE, EXTENDED RELEASE ORAL at 21:45

## 2021-09-03 RX ADMIN — Medication 40 MILLIGRAM(S): at 19:05

## 2021-09-03 RX ADMIN — Medication 3 MILLILITER(S): at 19:05

## 2021-09-03 RX ADMIN — ATORVASTATIN CALCIUM 80 MILLIGRAM(S): 80 TABLET, FILM COATED ORAL at 21:45

## 2021-09-03 RX ADMIN — Medication 50 GRAM(S): at 19:06

## 2021-09-03 RX ADMIN — Medication 4: at 21:45

## 2021-09-03 RX ADMIN — Medication 325 MILLIGRAM(S): at 19:20

## 2021-09-03 RX ADMIN — SACUBITRIL AND VALSARTAN 1 TABLET(S): 24; 26 TABLET, FILM COATED ORAL at 19:44

## 2021-09-03 RX ADMIN — ENOXAPARIN SODIUM 40 MILLIGRAM(S): 100 INJECTION SUBCUTANEOUS at 19:05

## 2021-09-03 RX ADMIN — INSULIN GLARGINE 45 UNIT(S): 100 INJECTION, SOLUTION SUBCUTANEOUS at 22:02

## 2021-09-03 NOTE — PATIENT PROFILE ADULT - FUNCTIONAL SCREEN CURRENT LEVEL: COMMUNICATION, MLM
H&P Update: 
Arlette Awad was seen and examined. History and physical has been reviewed. The patient has been examined. There have been no significant clinical changes since the completion of the originally dated History and Physical. 
Patient identified by surgeon; surgical site was confirmed by patient and surgeon. 0 = understands/communicates without difficulty

## 2021-09-03 NOTE — H&P ADULT - PROBLEM SELECTOR PLAN 3
+Harsh holosystolic murmur best heard at RUSB radiating to neck and probable moderate AS by OSH Echo. Pt has ?Bicuspid AV as documented on cath report from 2020 and outpt cardiologist note in HIE  - Echo ordered, f/u results and consider CTS structural consult

## 2021-09-03 NOTE — H&P ADULT - PROBLEM SELECTOR PLAN 6
F/u AM lipid panel  - Home Simvastatin 20mg QHS switched to Atorvastatin 80mg QHS pending lipid panel and further ischemic w/u

## 2021-09-03 NOTE — H&P ADULT - PROBLEM/PLAN-3
Right knee pain rated 4/10  2. ? ROM:  5-0-110, decreased pain  3. ? Strength:  Strength 5- throughout right LE  4. ? Function:  Optimal score 50%  5. Independent with Home Exercise Programs  6. Demonstrate Knowledge of fall prevention  LTG: (to be met in 12 treatments)  1. Pain 2/10 right knee  2. Optimal 25%       Pt. Education:  [x] Yes  [] No  [x] Reviewed Prior HEP/Ed  Method of Education: [x] Verbal  [x] Demo  [] Written  Comprehension of Education:  [x] Verbalizes understanding. [x] Demonstrates understanding. [] Needs review. [] Demonstrates/verbalizes HEP/Ed previously given. Plan: [x] Continue per plan of care.    [] Other:      Time In:1615            Time Out: 1700    Electronically signed by:  Adonis Wick PT DISPLAY PLAN FREE TEXT

## 2021-09-03 NOTE — H&P ADULT - PROBLEM SELECTOR PLAN 4
A1c 6.9 on arrival. Pt on Tresiba 12u BID, Basaglar 80u QHS, and Trulicity at home  - Ordered for Lantus 45u QHS, Lispro 15u TID, and FS/mISS

## 2021-09-03 NOTE — H&P ADULT - HISTORY OF PRESENT ILLNESS
60 y/o morbidly obese Male with PMHx of HTN, HLD, IDDM, HFpEF w/ newly reduced EF (currently ~35% by echo at [place] on 9/3/21, previously 53% by stress echo in 10/2020), s/p AICD (Medtronic for unclear reasons CKD Stage 3 (baseline Cr 1.4-1.7) 62 y/o morbidly obese Male with PMHx of HTN, HLD, IDDM, HFpEF w/ newly reduced EF (currently ~35% by echo at [place] on 9/3/21, previously 53% by stress echo in 10/2020), s/p AICD (Medtronic, placed for unclear reasons), non-obstructive CAD (by cath in 11/2020 w/ reportedly FFR negative R TOMASA (not on CPAP), CKD Stage 3 (baseline Cr 1.4-1.7), 60 y/o morbidly obese Male with PMHx of HTN, HLD, IDDM, HFpEF w/ newly reduced EF (currently ~35% by echo at [place] on 9/3/21, previously 53% by stress echo in 10/2020), s/p AICD (Medtronic, placed for unclear reasons), non-obstructive CAD (RPDA 70%, FFR negative by cath in 11/2020), ?Bicuspid AS (noted on cath and in separate outpt MD note in HIE), Moderate AS, TOMASA (not on CPAP), CKD Stage 3 (baseline Cr 1.4-1.7), with multiple CHF admissions in the last several months, who initially presented to [facility] on 9/2/21 for SOB and LE edema, found to have elevated BNP and Trop T (suspected to be 2/2 demand ischemia from CHF exacerbation), pulmonary congestion on CXR, and treated w/ IV diuresis for CHF exacerbation. Pt subsequently had Echo on 9/3/21 which revealed newly reduced EF to 35% from 53% (on Stress Echo in 10/2020) and probable moderate AS. In light of newly depressed EF pt transferred to [facility] for further workup and management.     Pt is currently asymptomatic and denies fevers, chills, cough, HA, dizziness, syncope, CP, palpitations, orthopnea, PND, n/v/d, melena, BRBPR, dysuria, LE edema, recent travel, sick contacts, or any other symptoms at this time.  EKG on arrival revealed...   62 y/o morbidly obese Male with FamHx of MI (parents), PMHx of HTN, HLD, IDDM c/b retinopathy (s/p recent panretinal photocoagulation of Right eye), HFpEF w/ newly reduced EF (currently ~35% by echo at [place] on 9/3/21, previously 53% by stress echo in 10/2020), s/p AICD (Medtronic, placed for unclear reasons), non-obstructive CAD (RPDA 70%, FFR negative by cath in 11/2020), ?Bicuspid AS (noted on cath and in separate outpt MD note in HIE), Moderate AS, TOMASA (not on CPAP), CKD Stage 3 (baseline Cr 1.4-1.7), with multiple CHF admissions in the last several months, who initially presented to [facility] on 9/2/21 for SOB and LE edema, found to have elevated BNP and Trop T (suspected to be 2/2 demand ischemia from CHF exacerbation), pulmonary congestion on CXR, and treated w/ IV diuresis for CHF exacerbation. Pt subsequently had Echo on 9/3/21 which revealed newly reduced EF to 35% from 53% (on Stress Echo in 10/2020) and probable moderate AS. In light of newly depressed EF pt transferred to [facility] for further workup and management.     Pt is states he feels much better than when he presented to [place] last night, but does admit to some residual LE edema. He denies fevers, chills, cough, HA, dizziness, syncope, CP, palpitations, orthopnea, PND, n/v/d, melena, BRBPR, dysuria, LE edema, recent travel, sick contacts, or any other symptoms at this time.

## 2021-09-03 NOTE — H&P ADULT - NSHPLABSRESULTS_GEN_ALL_CORE
09-03    141  |  105  |  21  ----------------------------<  148<H>  4.0   |  28  |  1.38<H>    Ca    8.7      03 Sep 2021 17:59  Mg     1.7     09-03    TPro  6.9  /  Alb  3.6  /  TBili  1.4<H>  /  DBili  x   /  AST  19  /  ALT  15  /  AlkPhos  57  09-03    CARDIAC MARKERS ( 03 Sep 2021 17:59 )  x     / 0.30 ng/mL / 146 U/L / x     / 5.1 ng/mL

## 2021-09-03 NOTE — H&P ADULT - NSICDXPASTMEDICALHX_GEN_ALL_CORE_FT
PAST MEDICAL HISTORY:  Chronic heart failure with preserved ejection fraction (HFpEF)     DM (diabetes mellitus)     HLD (hyperlipidemia)     HTN (hypertension)     TOMASA (obstructive sleep apnea)     Stage 3 chronic kidney disease      PAST MEDICAL HISTORY:  CAD (coronary artery disease)     Chronic heart failure with preserved ejection fraction (HFpEF)     DM (diabetes mellitus)     HLD (hyperlipidemia)     HTN (hypertension)     TOMASA (obstructive sleep apnea)     Stage 3 chronic kidney disease

## 2021-09-03 NOTE — H&P ADULT - PROBLEM SELECTOR PLAN 1
Currently satting well on RA, able to lie flat, +JVD, +bibasilar crackles/scattered wheezes, 2+ LE edema to upper shins. BNP 5194 at [facility], had been 8447 at [place]  - Echo on 9/3/21: EF 35%, diffuse LV hypokinesis, severe posterior wall hypokinesis, preserved lateral and proximal septal wall motion, probable moderate AS, mild-moderate MR, mild TR, moderate PHTN (PASP 55). EF had been previously 53% by Stress Echo in 10/2020  - Ordered for Lasix 40mg IV x 1 and Duoneb x 1 on arrival. Assess volume status in AM and dose Lasix appropriately. Pt on Lasix 40mg PO QD at home  - C/w home Coreg 25mg BID. Home Entresto 24/26 BID increased to 49/51 BID at OSH and will c/w increased dose  - Echo ordered to further assess AS, f/u results  - Core measures, strict I/Os, daily weights, 1L fluid restriction

## 2021-09-03 NOTE — H&P ADULT - PROBLEM SELECTOR PLAN 2
Currently CP free and HD stable, Trop 0.30 (CKs WNL) on arrival Trop T 5th Gen at ->257->277 (deemed at OSH to be 2/2 demand ischemia from CHF), EKG SR w/ inferior downsloping ST depressions and Q wave in I, aVL  - Pt had prior cath at [facility] in 11/2020 revealing RPDA 70%, FFR 0.92->0.84 (large vessel, supplying large area of posterior/lateral wall), rest w/ luminal irregularities  - Serial cardiac enzymes and EKGs ordered  - Given newly depressed EF w/ frequent CHF admissions, prior borderline stenosis in RPDA, now with severe posterior hypokinesis seen on OSH Echo, pt will likely benefit from cardiac cath +/- PCI if clinically indicated. Discuss further with attending in AM  - Ordered ASA 325mg PO x 1 on 9/3/21 and c/w ASA 81mg QD thereafter  - Changed home Simvastatin 20mg QHS to Atorvastatin 80mg QHS pending lipid panel in AM  - C/w home Carvedilol 25mg BID

## 2021-09-03 NOTE — H&P ADULT - ASSESSMENT
60 y/o morbidly obese Male with FamHx of MI (parents), PMHx of HTN, HLD, IDDM c/b retinopathy (s/p recent panretinal photocoagulation of Right eye), HFpEF w/ newly reduced EF (currently ~35% by echo at [place] on 9/3/21, previously 53% by stress echo in 10/2020), s/p AICD (Medtronic, placed for unclear reasons), non-obstructive CAD (RPDA 70%, FFR negative by cath in 11/2020), ?Bicuspid AS (noted on cath and in separate outpt MD note in HIE), Moderate AS, TOMASA (not on CPAP), CKD Stage 3 (baseline Cr 1.4-1.7), with multiple CHF admissions in the last several months, who initially presented to [facility] on 9/2/21 for SOB and LE edema, found to have elevated BNP and Trop T (suspected to be 2/2 demand ischemia from CHF exacerbation), pulmonary congestion on CXR, and treated w/ IV diuresis for CHF exacerbation. Pt subsequently had Echo on 9/3/21 which revealed newly reduced EF to 35% from 53% (on Stress Echo in 10/2020) and probable moderate AS. In light of newly depressed EF pt transferred to [facility] for further workup and management.

## 2021-09-03 NOTE — H&P ADULT - PROBLEM SELECTOR PLAN 7
Baseline Cr ~1.4-1.7, currently 1.38  - Avoid nephrotoxic agents, renally dose meds, monitor UOP and daily BMP

## 2021-09-03 NOTE — H&P ADULT - NSICDXFAMILYHX_GEN_ALL_CORE_FT
FAMILY HISTORY:  Father  Still living? No  FHx: myocardial infarction, Age at diagnosis: Age Unknown    Mother  Still living? Unknown  FHx: myocardial infarction, Age at diagnosis: Age Unknown

## 2021-09-03 NOTE — H&P ADULT - GASTROINTESTINAL DETAILS
nontender/no distention/no masses palpable/bowel sounds normal/no bruit/no rebound tenderness/no guarding/no rigidity

## 2021-09-03 NOTE — H&P ADULT - PROBLEM SELECTOR PLAN 8
Pt has never followed up after being diagnosed w/ TOMASA to start CPAP therapy  - Ordered CPAP as pt willing to try while admitted. Will need Pulm f/u for CPAP as outpt    VTE: Lovenox  GI: Pantoprazole  Dispo: Pending clinical progression, repeat Echo, and likely ischemic w/u

## 2021-09-04 LAB
ALBUMIN SERPL ELPH-MCNC: 3.3 G/DL — SIGNIFICANT CHANGE UP (ref 3.3–5)
ALP SERPL-CCNC: 53 U/L — SIGNIFICANT CHANGE UP (ref 40–120)
ALT FLD-CCNC: 13 U/L — SIGNIFICANT CHANGE UP (ref 10–45)
ANION GAP SERPL CALC-SCNC: 8 MMOL/L — SIGNIFICANT CHANGE UP (ref 5–17)
APTT BLD: 35.1 SEC — SIGNIFICANT CHANGE UP (ref 27.5–35.5)
APTT BLD: 35.2 SEC — SIGNIFICANT CHANGE UP (ref 27.5–35.5)
APTT BLD: 41.9 SEC — HIGH (ref 27.5–35.5)
AST SERPL-CCNC: 16 U/L — SIGNIFICANT CHANGE UP (ref 10–40)
BASOPHILS # BLD AUTO: 0.06 K/UL — SIGNIFICANT CHANGE UP (ref 0–0.2)
BASOPHILS NFR BLD AUTO: 0.8 % — SIGNIFICANT CHANGE UP (ref 0–2)
BILIRUB SERPL-MCNC: 1.1 MG/DL — SIGNIFICANT CHANGE UP (ref 0.2–1.2)
BUN SERPL-MCNC: 21 MG/DL — SIGNIFICANT CHANGE UP (ref 7–23)
CALCIUM SERPL-MCNC: 8.5 MG/DL — SIGNIFICANT CHANGE UP (ref 8.4–10.5)
CHLORIDE SERPL-SCNC: 102 MMOL/L — SIGNIFICANT CHANGE UP (ref 96–108)
CHOLEST SERPL-MCNC: 152 MG/DL — SIGNIFICANT CHANGE UP
CK MB CFR SERPL CALC: 3.2 NG/ML — SIGNIFICANT CHANGE UP (ref 0–6.7)
CK MB CFR SERPL CALC: 3.3 NG/ML — SIGNIFICANT CHANGE UP (ref 0–6.7)
CK SERPL-CCNC: 95 U/L — SIGNIFICANT CHANGE UP (ref 30–200)
CK SERPL-CCNC: 99 U/L — SIGNIFICANT CHANGE UP (ref 30–200)
CO2 SERPL-SCNC: 29 MMOL/L — SIGNIFICANT CHANGE UP (ref 22–31)
COVID-19 SPIKE DOMAIN AB INTERP: POSITIVE
COVID-19 SPIKE DOMAIN ANTIBODY RESULT: >250 U/ML — HIGH
CREAT SERPL-MCNC: 1.58 MG/DL — HIGH (ref 0.5–1.3)
EOSINOPHIL # BLD AUTO: 0.11 K/UL — SIGNIFICANT CHANGE UP (ref 0–0.5)
EOSINOPHIL NFR BLD AUTO: 1.5 % — SIGNIFICANT CHANGE UP (ref 0–6)
GLUCOSE BLDC GLUCOMTR-MCNC: 100 MG/DL — HIGH (ref 70–99)
GLUCOSE BLDC GLUCOMTR-MCNC: 123 MG/DL — HIGH (ref 70–99)
GLUCOSE BLDC GLUCOMTR-MCNC: 150 MG/DL — HIGH (ref 70–99)
GLUCOSE BLDC GLUCOMTR-MCNC: 73 MG/DL — SIGNIFICANT CHANGE UP (ref 70–99)
GLUCOSE SERPL-MCNC: 120 MG/DL — HIGH (ref 70–99)
HCT VFR BLD CALC: 41.2 % — SIGNIFICANT CHANGE UP (ref 39–50)
HCT VFR BLD CALC: 43.1 % — SIGNIFICANT CHANGE UP (ref 39–50)
HCV AB S/CO SERPL IA: 0.04 S/CO — SIGNIFICANT CHANGE UP
HCV AB SERPL-IMP: SIGNIFICANT CHANGE UP
HDLC SERPL-MCNC: 27 MG/DL — LOW
HGB BLD-MCNC: 12.7 G/DL — LOW (ref 13–17)
HGB BLD-MCNC: 13.6 G/DL — SIGNIFICANT CHANGE UP (ref 13–17)
IMM GRANULOCYTES NFR BLD AUTO: 0.6 % — SIGNIFICANT CHANGE UP (ref 0–1.5)
INR BLD: 1.38 — HIGH (ref 0.88–1.16)
LIPID PNL WITH DIRECT LDL SERPL: 110 MG/DL — HIGH
LYMPHOCYTES # BLD AUTO: 1.13 K/UL — SIGNIFICANT CHANGE UP (ref 1–3.3)
LYMPHOCYTES # BLD AUTO: 15.8 % — SIGNIFICANT CHANGE UP (ref 13–44)
MAGNESIUM SERPL-MCNC: 2.1 MG/DL — SIGNIFICANT CHANGE UP (ref 1.6–2.6)
MCHC RBC-ENTMCNC: 24.7 PG — LOW (ref 27–34)
MCHC RBC-ENTMCNC: 25 PG — LOW (ref 27–34)
MCHC RBC-ENTMCNC: 30.8 GM/DL — LOW (ref 32–36)
MCHC RBC-ENTMCNC: 31.6 GM/DL — LOW (ref 32–36)
MCV RBC AUTO: 79.1 FL — LOW (ref 80–100)
MCV RBC AUTO: 80.2 FL — SIGNIFICANT CHANGE UP (ref 80–100)
MONOCYTES # BLD AUTO: 0.65 K/UL — SIGNIFICANT CHANGE UP (ref 0–0.9)
MONOCYTES NFR BLD AUTO: 9.1 % — SIGNIFICANT CHANGE UP (ref 2–14)
NEUTROPHILS # BLD AUTO: 5.16 K/UL — SIGNIFICANT CHANGE UP (ref 1.8–7.4)
NEUTROPHILS NFR BLD AUTO: 72.2 % — SIGNIFICANT CHANGE UP (ref 43–77)
NON HDL CHOLESTEROL: 125 MG/DL — SIGNIFICANT CHANGE UP
NRBC # BLD: 0 /100 WBCS — SIGNIFICANT CHANGE UP (ref 0–0)
NRBC # BLD: 0 /100 WBCS — SIGNIFICANT CHANGE UP (ref 0–0)
PLATELET # BLD AUTO: 208 K/UL — SIGNIFICANT CHANGE UP (ref 150–400)
PLATELET # BLD AUTO: 226 K/UL — SIGNIFICANT CHANGE UP (ref 150–400)
POTASSIUM SERPL-MCNC: 3.8 MMOL/L — SIGNIFICANT CHANGE UP (ref 3.5–5.3)
POTASSIUM SERPL-SCNC: 3.8 MMOL/L — SIGNIFICANT CHANGE UP (ref 3.5–5.3)
PROT SERPL-MCNC: 6.2 G/DL — SIGNIFICANT CHANGE UP (ref 6–8.3)
PROTHROM AB SERPL-ACNC: 16.3 SEC — HIGH (ref 10.6–13.6)
RBC # BLD: 5.14 M/UL — SIGNIFICANT CHANGE UP (ref 4.2–5.8)
RBC # BLD: 5.45 M/UL — SIGNIFICANT CHANGE UP (ref 4.2–5.8)
RBC # FLD: 15.6 % — HIGH (ref 10.3–14.5)
RBC # FLD: 15.9 % — HIGH (ref 10.3–14.5)
SARS-COV-2 IGG+IGM SERPL QL IA: >250 U/ML — HIGH
SARS-COV-2 IGG+IGM SERPL QL IA: POSITIVE
SODIUM SERPL-SCNC: 139 MMOL/L — SIGNIFICANT CHANGE UP (ref 135–145)
TRIGL SERPL-MCNC: 73 MG/DL — SIGNIFICANT CHANGE UP
TROPONIN T SERPL-MCNC: 0.3 NG/ML — CRITICAL HIGH (ref 0–0.01)
TROPONIN T SERPL-MCNC: 0.34 NG/ML — CRITICAL HIGH (ref 0–0.01)
WBC # BLD: 7.15 K/UL — SIGNIFICANT CHANGE UP (ref 3.8–10.5)
WBC # BLD: 7.55 K/UL — SIGNIFICANT CHANGE UP (ref 3.8–10.5)
WBC # FLD AUTO: 7.15 K/UL — SIGNIFICANT CHANGE UP (ref 3.8–10.5)
WBC # FLD AUTO: 7.55 K/UL — SIGNIFICANT CHANGE UP (ref 3.8–10.5)

## 2021-09-04 PROCEDURE — 93306 TTE W/DOPPLER COMPLETE: CPT | Mod: 26

## 2021-09-04 PROCEDURE — 99232 SBSQ HOSP IP/OBS MODERATE 35: CPT

## 2021-09-04 PROCEDURE — 71045 X-RAY EXAM CHEST 1 VIEW: CPT | Mod: 26

## 2021-09-04 RX ORDER — FUROSEMIDE 40 MG
40 TABLET ORAL
Refills: 0 | Status: DISCONTINUED | OUTPATIENT
Start: 2021-09-04 | End: 2021-09-05

## 2021-09-04 RX ORDER — HEPARIN SODIUM 5000 [USP'U]/ML
5000 INJECTION INTRAVENOUS; SUBCUTANEOUS ONCE
Refills: 0 | Status: COMPLETED | OUTPATIENT
Start: 2021-09-04 | End: 2021-09-04

## 2021-09-04 RX ORDER — CLOPIDOGREL BISULFATE 75 MG/1
600 TABLET, FILM COATED ORAL ONCE
Refills: 0 | Status: COMPLETED | OUTPATIENT
Start: 2021-09-04 | End: 2021-09-04

## 2021-09-04 RX ORDER — HEPARIN SODIUM 5000 [USP'U]/ML
INJECTION INTRAVENOUS; SUBCUTANEOUS
Qty: 25000 | Refills: 0 | Status: DISCONTINUED | OUTPATIENT
Start: 2021-09-04 | End: 2021-09-06

## 2021-09-04 RX ORDER — CLOPIDOGREL BISULFATE 75 MG/1
75 TABLET, FILM COATED ORAL DAILY
Refills: 0 | Status: DISCONTINUED | OUTPATIENT
Start: 2021-09-05 | End: 2021-09-08

## 2021-09-04 RX ORDER — HEPARIN SODIUM 5000 [USP'U]/ML
6000 INJECTION INTRAVENOUS; SUBCUTANEOUS EVERY 6 HOURS
Refills: 0 | Status: DISCONTINUED | OUTPATIENT
Start: 2021-09-04 | End: 2021-09-06

## 2021-09-04 RX ADMIN — INSULIN GLARGINE 45 UNIT(S): 100 INJECTION, SOLUTION SUBCUTANEOUS at 22:20

## 2021-09-04 RX ADMIN — ENOXAPARIN SODIUM 40 MILLIGRAM(S): 100 INJECTION SUBCUTANEOUS at 06:51

## 2021-09-04 RX ADMIN — HEPARIN SODIUM 1000 UNIT(S)/HR: 5000 INJECTION INTRAVENOUS; SUBCUTANEOUS at 09:17

## 2021-09-04 RX ADMIN — Medication 40 MILLIGRAM(S): at 21:29

## 2021-09-04 RX ADMIN — HEPARIN SODIUM 1500 UNIT(S)/HR: 5000 INJECTION INTRAVENOUS; SUBCUTANEOUS at 23:24

## 2021-09-04 RX ADMIN — DULOXETINE HYDROCHLORIDE 60 MILLIGRAM(S): 30 CAPSULE, DELAYED RELEASE ORAL at 14:21

## 2021-09-04 RX ADMIN — Medication 15 UNIT(S): at 09:29

## 2021-09-04 RX ADMIN — CLOPIDOGREL BISULFATE 600 MILLIGRAM(S): 75 TABLET, FILM COATED ORAL at 18:22

## 2021-09-04 RX ADMIN — HEPARIN SODIUM 6000 UNIT(S): 5000 INJECTION INTRAVENOUS; SUBCUTANEOUS at 23:30

## 2021-09-04 RX ADMIN — SACUBITRIL AND VALSARTAN 1 TABLET(S): 24; 26 TABLET, FILM COATED ORAL at 18:22

## 2021-09-04 RX ADMIN — CARVEDILOL PHOSPHATE 25 MILLIGRAM(S): 80 CAPSULE, EXTENDED RELEASE ORAL at 21:29

## 2021-09-04 RX ADMIN — HEPARIN SODIUM 5000 UNIT(S): 5000 INJECTION INTRAVENOUS; SUBCUTANEOUS at 09:12

## 2021-09-04 RX ADMIN — SACUBITRIL AND VALSARTAN 1 TABLET(S): 24; 26 TABLET, FILM COATED ORAL at 06:52

## 2021-09-04 RX ADMIN — HEPARIN SODIUM 1200 UNIT(S)/HR: 5000 INJECTION INTRAVENOUS; SUBCUTANEOUS at 16:22

## 2021-09-04 RX ADMIN — PANTOPRAZOLE SODIUM 40 MILLIGRAM(S): 20 TABLET, DELAYED RELEASE ORAL at 06:52

## 2021-09-04 RX ADMIN — Medication 81 MILLIGRAM(S): at 16:09

## 2021-09-04 RX ADMIN — ATORVASTATIN CALCIUM 80 MILLIGRAM(S): 80 TABLET, FILM COATED ORAL at 21:29

## 2021-09-04 RX ADMIN — Medication 40 MILLIGRAM(S): at 14:22

## 2021-09-04 NOTE — DIETITIAN INITIAL EVALUATION ADULT. - PROBLEM SELECTOR PLAN 1
Currently satting well on RA, able to lie flat, +JVD, +bibasilar crackles/scattered wheezes, 2+ LE edema to upper shins. BNP 5194 at [facility], had been 8447 at [place]  - Echo on 9/3/21: EF 35%, diffuse LV hypokinesis, severe posterior wall hypokinesis, preserved lateral and proximal septal wall motion, probable moderate AS, mild-moderate MR, mild TR, moderate PHTN (PASP 55). EF had been previously 53% by Stress Echo in 10/2020  - Ordered for Lasix 40mg IV x 1 and Duoneb x 1 on arrival. Assess volume status in AM and dose Lasix appropriately. Pt on Lasix 40mg PO QD at home  - C/w home Coreg 25mg BID. Home Entresto 24/26 BID increased to 49/51 BID at OSH and will c/w increased dose  - Echo ordered to further assess AS, f/u results  - Core measures, strict I/Os, daily weights, 1L fluid restriction

## 2021-09-04 NOTE — PROGRESS NOTE ADULT - PROBLEM SELECTOR PLAN 4
--A1c 6.9 on arrival. Pt on Tresiba 12u BID, Basaglar 80u QHS, and Trulicity at home   --Ordered for Lantus 45u QHS, Lispro 15u TID, and FS/mISS.

## 2021-09-04 NOTE — PROGRESS NOTE ADULT - PROBLEM SELECTOR PLAN 1
--SpO2 96% on RA; no orthopnea; lungs CTA; 2+ LE edema.    --I/O: -2.5L/24hrs.    --TTE (9/4/21): LVEF 30-35%, severe hypokinesis inferior and lateral regions, moderate hypokinesis remaining regions, bicuspid aortic valve w/ moderate AS (mean gradient 33.50, peak 60.53, ZAINAB 1.13), mild-mod AR – full report available.    --CONT: Lasix 40mg IV BID, Entresto 49/51 PO BID.

## 2021-09-04 NOTE — PROGRESS NOTE ADULT - PROBLEM SELECTOR PLAN 3
--Harsh holosystolic murmur best heard at RUSB.    --TTE w/ bicuspid valve w/ moderate AS (gradients/ZAINAB listed above in TTE report).    --No need for structural heart consult at this time.

## 2021-09-04 NOTE — PROGRESS NOTE ADULT - ASSESSMENT
62 y/o morbidly obese Male with FamHx of MI (parents), PMHx of HTN, HLD, IDDM c/b retinopathy (s/p recent panretinal photocoagulation of Right eye), HFpEF w/ newly reduced EF (currently ~35% by echo at [place] on 9/3/21, previously 53% by stress echo in 10/2020), s/p AICD (Medtronic, placed for unclear reasons), non-obstructive CAD (RPDA 70%, FFR negative by cath in 11/2020), ?Bicuspid AS (noted on cath and in separate outpt MD note in HIE), Moderate AS, TOMASA (not on CPAP), CKD Stage 3 (baseline Cr 1.4-1.7), with multiple CHF admissions in the last several months, who initially presented to [facility] on 9/2/21 for SOB and LE edema, found to have elevated BNP and Trop T (suspected to be 2/2 demand ischemia from CHF exacerbation), pulmonary congestion on CXR, and treated w/ IV diuresis for CHF exacerbation. Pt subsequently had Echo on 9/3/21 which revealed newly reduced EF to 35% from 53% (on Stress Echo in 10/2020) and probable moderate AS. In light of newly depressed EF pt transferred to [facility] for further workup and management.  62yo morbidly obese Male w/ FHx of MI (parents), and PMHx HTN, HLD, DM T2 c/b retinopathy, Hx of HFpEF w/ newly reduced EF (30-35%), s/p AICD (Medtronic, placed for unclear reason), non-obs CAD (RPDA 70% FFR (-) 11/2020), Bicuspic Aortic valve w/ moderate AS, TOMASA (not on CPAP), CKD III (baseline Cr 1.4-1.7) who was transferred from [facility] w/ CHF exacerbation and ACS.    --CHF: Lasix 40mg IV BID; monitor I/Os.    --ACS: trop T peak 0.34; TWI development inferolateral leads; heparin gtt started 9/4/21; plan for cath tuesday 9/7/21.

## 2021-09-04 NOTE — PHYSICAL THERAPY INITIAL EVALUATION ADULT - PERTINENT HX OF CURRENT PROBLEM, REHAB EVAL
62 y/o morbidly obese Male PMHx of HTN, HLD, IDDM c/b retinopathy, HFpEF, Moderate AS, CKD stage II p/w SOB and LE edema, found to have elevated BNP and Trop T and pulmonary congestion on CXR. Echo on 9/3/21 which revealed newly reduced EF to 35% from 53% and probable moderate AS.

## 2021-09-04 NOTE — DIETITIAN INITIAL EVALUATION ADULT. - OTHER INFO
62 yo/male with PMHx HTN, HLD, IDDM c/b retinopathy, HFpEF w/newly reduced EF of 35%, non-obstructive CAD, CKD, multiple admissions for CHF exacerbations, presents now w/SOB and LE edema, with elevated BNP likely i/s/o CHF exacerbation. Pt seen in room, awake, alert, very pleasant. He endorsed that his appetite is very good but knows that he eats some of the "wrong" foods. He likes hamburgers, hot dogs, and steak, but also eats of a lot of chicken, fish, eggs, beans. Is aware of recommendation to minimize sodium intake, and claims to not add salt to foods but still appears to be eating high sodium foods. Pt was very pleased to hear that A1C was 6.9% and he hopes to lower it further. Currently w/good intake- 100% of breakfast consumed. He denied N/V/C/D or pain. No difficulty chewing or swallowing. Skin intact pressure-wise w/B/L LE trace edema. Afebrile. CHF/DM diet ed reviewed- pt understanding, receptive. Will continue to follow per RD protocol.

## 2021-09-04 NOTE — PHYSICAL THERAPY INITIAL EVALUATION ADULT - GENERAL OBSERVATIONS, REHAB EVAL
Patient received semi-anderson in bed  in NAD on RA, +Telemerty, +Heplock. Cleared by PADMA Sawant and CAROLINA Cabrera. Agreeable to PT.

## 2021-09-04 NOTE — PHYSICAL THERAPY INITIAL EVALUATION ADULT - ADDITIONAL COMMENTS
Patient lives with spouse in elevator accessible apartment. Does not use any DME at baseline. Denies recent Hx falls.

## 2021-09-04 NOTE — PROGRESS NOTE ADULT - PROBLEM SELECTOR PLAN 7
--Baseline Cr ~1.4-1.7, currently 1.58 (monitor response to further IV diuresis).   --Avoid nephrotoxic agents, renally dose meds, monitor UOP and daily BMP.

## 2021-09-04 NOTE — PROGRESS NOTE ADULT - PROBLEM SELECTOR PLAN 8
--Pt has never followed up after being diagnosed w/ TOMASA to start CPAP therapy   --Ordered CPAP as pt willing to try while admitted. Will need Pulm f/u for CPAP as outpt        VTE: Lovenox   GI: Pantoprazole   PT eval: home no needs   Dispo: pending clinical progression; likely cardiac cath tuesday 9/7/21.

## 2021-09-04 NOTE — DIETITIAN INITIAL EVALUATION ADULT. - PROBLEM SELECTOR PLAN 2
Currently CP free and HD stable, Trop 0.30 (CKs WNL) on arrival Trop T 5th Gen at ->257->277 (deemed at OSH to be 2/2 demand ischemia from CHF), EKG SR w/ inferior downsloping ST depressions and Q wave in I, aVL  - Pt had prior cath at [facility] in 11/2020 revealing RPDA 70%, FFR 0.92->0.84 (large vessel, supplying large area of posterior/lateral wall), rest w/ luminal irregularities  - Serial cardiac enzymes and EKGs ordered  - Given newly depressed EF w/ frequent CHF admissions, prior borderline stenosis in RPDA, now with severe posterior hypokinesis seen on OSH Echo, pt will likely benefit from cardiac cath +/- PCI if clinically indicated. Discuss further with attending in AM  - Ordered ASA 325mg PO x 1 on 9/3/21 and c/w ASA 81mg QD thereafter  - Changed home Simvastatin 20mg QHS to Atorvastatin 80mg QHS pending lipid panel in AM  - C/w home Carvedilol 25mg BID

## 2021-09-04 NOTE — PROGRESS NOTE ADULT - PROBLEM SELECTOR PLAN 2
--Remains asymptomatic and HD stable; Trop T 0.30 -> 0.30 -> 0.34 -> 0.30.    --EKG: SR w/ development of TWI in II/III/aVF, V2-V6.    --Cardiac Cath (11/2020) revealing RPDA 70% (FFR negative; large vessel, supplying large area of posterior/lateral wall), remaining arteries w/ luminal irregularities.    --s/p ASA/Plavix load.    --Initiated Heparin gtt 8:30am on 9/4/21.    --CONT: ASA 81mg PO QD, Plavix 75mg PO QD, Atorvastatin 80mg PO QD, Coreg 25mg PO BID.    --PLAN: cardiac catheterization once more euvolemic (tentatively Tuesday 9/7/21 pending clinical progression).

## 2021-09-04 NOTE — DIETITIAN INITIAL EVALUATION ADULT. - ADD RECOMMEND
1. Reinforce diet ed 2. Strict I&Os w/daily wts 3. Monitor lytes and replete prn. POC BG q6hrs 4. Pain and bowel regimens per team

## 2021-09-04 NOTE — DIETITIAN INITIAL EVALUATION ADULT. - PERSON TAUGHT/METHOD
CHF/DM diet ed, purpose of fluid restriction; fair understanding, requires reinforcement/verbal instruction/written material/patient instructed

## 2021-09-04 NOTE — PROGRESS NOTE ADULT - SUBJECTIVE AND OBJECTIVE BOX
**INCOMPLETE / IN PROGRESS NOTE**    Cardiology PA Adult Progress Note    Subjective Assessment:  	  MEDICATIONS:  carvedilol 25 milliGRAM(s) Oral every 12 hours  sacubitril 49 mG/valsartan 51 mG 1 Tablet(s) Oral two times a day  cyclobenzaprine 5 milliGRAM(s) Oral three times a day PRN  DULoxetine 60 milliGRAM(s) Oral daily  pantoprazole    Tablet 40 milliGRAM(s) Oral before breakfast  atorvastatin 80 milliGRAM(s) Oral at bedtime  dextrose 40% Gel 15 Gram(s) Oral once  dextrose 50% Injectable 25 Gram(s) IV Push once  dextrose 50% Injectable 12.5 Gram(s) IV Push once  dextrose 50% Injectable 25 Gram(s) IV Push once  glucagon  Injectable 1 milliGRAM(s) IntraMuscular once  insulin glargine Injectable (LANTUS) 45 Unit(s) SubCutaneous at bedtime  insulin lispro (ADMELOG) corrective regimen sliding scale   SubCutaneous Before meals and at bedtime  insulin lispro Injectable (ADMELOG) 15 Unit(s) SubCutaneous three times a day before meals  aspirin enteric coated 81 milliGRAM(s) Oral daily  dextrose 5%. 1000 milliLiter(s) IV Continuous <Continuous>  dextrose 5%. 1000 milliLiter(s) IV Continuous <Continuous>  enoxaparin Injectable 40 milliGRAM(s) SubCutaneous every 12 hours  influenza   Vaccine 0.5 milliLiter(s) IntraMuscular once      PHYSICAL EXAM:  TELEMETRY:  T(C): 36.2 (09-04-21 @ 07:07), Max: 36.8 (09-03-21 @ 16:38)  HR: 74 (09-04-21 @ 05:56) (67 - 75)  BP: 121/78 (09-04-21 @ 01:00) (121/78 - 158/91)  RR: 18 (09-04-21 @ 05:56) (16 - 20)  SpO2: 96% (09-04-21 @ 05:56) (92% - 97%)  Wt(kg): --  I&O's Summary    03 Sep 2021 07:01  -  04 Sep 2021 07:00  --------------------------------------------------------  IN: 160 mL / OUT: 2730 mL / NET: -2570 mL      Height (cm): 182.9 (09-03 @ 19:33)  Weight (kg): 146.5 (09-04 @ 06:57)  BMI (kg/m2): 43.8 (09-04 @ 06:57)  BSA (m2): 2.61 (09-04 @ 06:57)  [place]:  Central/PICC/Mid Line:                                         Appearance: Normal	  HEENT:   Normal oral mucosa, PERRL, EOMI	  Neck: Supple, + JVD/ - JVD; Carotid Bruit   Cardiovascular: Normal S1 S2, No JVD, No murmurs,   Respiratory: Lungs clear to auscultation/Decreased Breath Sounds/No Rales, Rhonchi, Wheezing	  Gastrointestinal:  Soft, Non-tender, + BS	  Skin: No rashes, No ecchymoses, No cyanosis  Extremities: Normal range of motion, No clubbing, cyanosis or edema  Vascular: Peripheral pulses palpable 2+ bilaterally  Neurologic: Non-focal  Psychiatry: A & O x 3, Mood & affect appropriate  	    LABS:	 	  CARDIAC MARKERS:               12.7   7.15  )-----------( 208      ( 04 Sep 2021 06:13 )             41.2     139  |  102  |  21  ----------------------------<  120<H>  3.8   |  29  |  1.58<H>    Ca    8.5      04 Sep 2021 06:13  Mg     2.1     09-04    TPro  6.2  /  Alb  3.3  /  TBili  1.1  /  DBili  x   /  AST  16  /  ALT  13  /  AlkPhos  53  09-04    proBNP: Serum Pro-Brain Natriuretic Peptide: 5194 pg/mL (09-03 @ 17:59)    TSH: Thyroid Stimulating Hormone, Serum: 0.914 uIU/mL (09-03 @ 17:59)     Cardiology PA Adult Progress Note    Subjective Assessment: Pt states he feels well and his breathing has gotten better. No events overnight. Pt denies symptoms at this time.  	  MEDICATIONS:  carvedilol 25 milliGRAM(s) Oral every 12 hours  sacubitril 49 mG/valsartan 51 mG 1 Tablet(s) Oral two times a day  cyclobenzaprine 5 milliGRAM(s) Oral three times a day PRN  DULoxetine 60 milliGRAM(s) Oral daily  pantoprazole    Tablet 40 milliGRAM(s) Oral before breakfast  atorvastatin 80 milliGRAM(s) Oral at bedtime  dextrose 40% Gel 15 Gram(s) Oral once  dextrose 50% Injectable 25 Gram(s) IV Push once  dextrose 50% Injectable 12.5 Gram(s) IV Push once  dextrose 50% Injectable 25 Gram(s) IV Push once  glucagon  Injectable 1 milliGRAM(s) IntraMuscular once  insulin glargine Injectable (LANTUS) 45 Unit(s) SubCutaneous at bedtime  insulin lispro (ADMELOG) corrective regimen sliding scale   SubCutaneous Before meals and at bedtime  insulin lispro Injectable (ADMELOG) 15 Unit(s) SubCutaneous three times a day before meals  aspirin enteric coated 81 milliGRAM(s) Oral daily  dextrose 5%. 1000 milliLiter(s) IV Continuous <Continuous>  dextrose 5%. 1000 milliLiter(s) IV Continuous <Continuous>  enoxaparin Injectable 40 milliGRAM(s) SubCutaneous every 12 hours  influenza   Vaccine 0.5 milliLiter(s) IntraMuscular once      PHYSICAL EXAM:  TELEMETRY: no events noted  T(C): 36.2 (09-04-21 @ 07:07), Max: 36.8 (09-03-21 @ 16:38)  HR: 74 (09-04-21 @ 05:56) (67 - 75)  BP: 121/78 (09-04-21 @ 01:00) (121/78 - 158/91)  RR: 18 (09-04-21 @ 05:56) (16 - 20)  SpO2: 96% (09-04-21 @ 05:56) (92% - 97%)  Wt(kg): --  I&O's Summary    03 Sep 2021 07:01  -  04 Sep 2021 07:00  --------------------------------------------------------  IN: 160 mL / OUT: 2730 mL / NET: -2570 mL      Height (cm): 182.9 (09-03 @ 19:33)  Weight (kg): 146.5 (09-04 @ 06:57)  BMI (kg/m2): 43.8 (09-04 @ 06:57)  BSA (m2): 2.61 (09-04 @ 06:57):                                           Appearance: Normal; obese	  HEENT:   Normal oral mucosa, PERRL, EOMI	  Neck: unable to assess JVD 2/2 body habitus  Cardiovascular: Normal S1 S2, No JVD, No murmurs,   Respiratory: Lungs clear to auscultation  Gastrointestinal:  Soft, Non-tender, + BS	  Skin: No rashes, No ecchymoses, No cyanosis  Extremities: Normal range of motion, No clubbing, cyanosis; 1-2+ pitting edema B/L   Neurologic: Non-focal  Psychiatry: A & O x 3, Mood & affect appropriate  	    LABS:	 	  CARDIAC MARKERS:               12.7   7.15  )-----------( 208      ( 04 Sep 2021 06:13 )             41.2     139  |  102  |  21  ----------------------------<  120<H>  3.8   |  29  |  1.58<H>    Ca    8.5      04 Sep 2021 06:13  Mg     2.1     09-04    TPro  6.2  /  Alb  3.3  /  TBili  1.1  /  DBili  x   /  AST  16  /  ALT  13  /  AlkPhos  53  09-04    proBNP: Serum Pro-Brain Natriuretic Peptide: 5194 pg/mL (09-03 @ 17:59)    TSH: Thyroid Stimulating Hormone, Serum: 0.914 uIU/mL (09-03 @ 17:59)

## 2021-09-04 NOTE — DIETITIAN INITIAL EVALUATION ADULT. - OTHER CALCULATIONS
Ideal body weight used for calculations as pt >120% of IBW (181% IBW). Needs estimated for age. Fluids pre team 2/2 CHF (1L/day)

## 2021-09-05 LAB
ANION GAP SERPL CALC-SCNC: 8 MMOL/L — SIGNIFICANT CHANGE UP (ref 5–17)
APTT BLD: 62.1 SEC — HIGH (ref 27.5–35.5)
APTT BLD: 64.7 SEC — HIGH (ref 27.5–35.5)
APTT BLD: 76.1 SEC — HIGH (ref 27.5–35.5)
BUN SERPL-MCNC: 27 MG/DL — HIGH (ref 7–23)
CALCIUM SERPL-MCNC: 9.1 MG/DL — SIGNIFICANT CHANGE UP (ref 8.4–10.5)
CHLORIDE SERPL-SCNC: 101 MMOL/L — SIGNIFICANT CHANGE UP (ref 96–108)
CO2 SERPL-SCNC: 28 MMOL/L — SIGNIFICANT CHANGE UP (ref 22–31)
CREAT SERPL-MCNC: 1.7 MG/DL — HIGH (ref 0.5–1.3)
GLUCOSE BLDC GLUCOMTR-MCNC: 102 MG/DL — HIGH (ref 70–99)
GLUCOSE BLDC GLUCOMTR-MCNC: 119 MG/DL — HIGH (ref 70–99)
GLUCOSE BLDC GLUCOMTR-MCNC: 168 MG/DL — HIGH (ref 70–99)
GLUCOSE BLDC GLUCOMTR-MCNC: 80 MG/DL — SIGNIFICANT CHANGE UP (ref 70–99)
GLUCOSE SERPL-MCNC: 146 MG/DL — HIGH (ref 70–99)
HCT VFR BLD CALC: 43.5 % — SIGNIFICANT CHANGE UP (ref 39–50)
HGB BLD-MCNC: 13.9 G/DL — SIGNIFICANT CHANGE UP (ref 13–17)
MAGNESIUM SERPL-MCNC: 1.8 MG/DL — SIGNIFICANT CHANGE UP (ref 1.6–2.6)
MCHC RBC-ENTMCNC: 25.2 PG — LOW (ref 27–34)
MCHC RBC-ENTMCNC: 32 GM/DL — SIGNIFICANT CHANGE UP (ref 32–36)
MCV RBC AUTO: 78.8 FL — LOW (ref 80–100)
NRBC # BLD: 0 /100 WBCS — SIGNIFICANT CHANGE UP (ref 0–0)
PLATELET # BLD AUTO: 233 K/UL — SIGNIFICANT CHANGE UP (ref 150–400)
POTASSIUM SERPL-MCNC: 3.6 MMOL/L — SIGNIFICANT CHANGE UP (ref 3.5–5.3)
POTASSIUM SERPL-SCNC: 3.6 MMOL/L — SIGNIFICANT CHANGE UP (ref 3.5–5.3)
RBC # BLD: 5.52 M/UL — SIGNIFICANT CHANGE UP (ref 4.2–5.8)
RBC # FLD: 15.7 % — HIGH (ref 10.3–14.5)
SODIUM SERPL-SCNC: 137 MMOL/L — SIGNIFICANT CHANGE UP (ref 135–145)
WBC # BLD: 7.24 K/UL — SIGNIFICANT CHANGE UP (ref 3.8–10.5)
WBC # FLD AUTO: 7.24 K/UL — SIGNIFICANT CHANGE UP (ref 3.8–10.5)

## 2021-09-05 PROCEDURE — 99223 1ST HOSP IP/OBS HIGH 75: CPT

## 2021-09-05 PROCEDURE — 99232 SBSQ HOSP IP/OBS MODERATE 35: CPT

## 2021-09-05 RX ORDER — MAGNESIUM OXIDE 400 MG ORAL TABLET 241.3 MG
800 TABLET ORAL ONCE
Refills: 0 | Status: COMPLETED | OUTPATIENT
Start: 2021-09-05 | End: 2021-09-05

## 2021-09-05 RX ORDER — POTASSIUM CHLORIDE 20 MEQ
40 PACKET (EA) ORAL ONCE
Refills: 0 | Status: COMPLETED | OUTPATIENT
Start: 2021-09-05 | End: 2021-09-05

## 2021-09-05 RX ORDER — SACUBITRIL AND VALSARTAN 24; 26 MG/1; MG/1
1 TABLET, FILM COATED ORAL
Refills: 0 | Status: DISCONTINUED | OUTPATIENT
Start: 2021-09-05 | End: 2021-09-06

## 2021-09-05 RX ADMIN — CARVEDILOL PHOSPHATE 25 MILLIGRAM(S): 80 CAPSULE, EXTENDED RELEASE ORAL at 09:09

## 2021-09-05 RX ADMIN — HEPARIN SODIUM 1400 UNIT(S)/HR: 5000 INJECTION INTRAVENOUS; SUBCUTANEOUS at 13:30

## 2021-09-05 RX ADMIN — PANTOPRAZOLE SODIUM 40 MILLIGRAM(S): 20 TABLET, DELAYED RELEASE ORAL at 06:52

## 2021-09-05 RX ADMIN — SACUBITRIL AND VALSARTAN 1 TABLET(S): 24; 26 TABLET, FILM COATED ORAL at 12:50

## 2021-09-05 RX ADMIN — MAGNESIUM OXIDE 400 MG ORAL TABLET 800 MILLIGRAM(S): 241.3 TABLET ORAL at 09:08

## 2021-09-05 RX ADMIN — INSULIN GLARGINE 45 UNIT(S): 100 INJECTION, SOLUTION SUBCUTANEOUS at 22:44

## 2021-09-05 RX ADMIN — DULOXETINE HYDROCHLORIDE 60 MILLIGRAM(S): 30 CAPSULE, DELAYED RELEASE ORAL at 12:50

## 2021-09-05 RX ADMIN — CLOPIDOGREL BISULFATE 75 MILLIGRAM(S): 75 TABLET, FILM COATED ORAL at 12:50

## 2021-09-05 RX ADMIN — Medication 81 MILLIGRAM(S): at 12:50

## 2021-09-05 RX ADMIN — ATORVASTATIN CALCIUM 80 MILLIGRAM(S): 80 TABLET, FILM COATED ORAL at 22:44

## 2021-09-05 RX ADMIN — Medication 40 MILLIGRAM(S): at 06:52

## 2021-09-05 RX ADMIN — HEPARIN SODIUM 1400 UNIT(S)/HR: 5000 INJECTION INTRAVENOUS; SUBCUTANEOUS at 19:49

## 2021-09-05 RX ADMIN — SACUBITRIL AND VALSARTAN 1 TABLET(S): 24; 26 TABLET, FILM COATED ORAL at 23:33

## 2021-09-05 RX ADMIN — HEPARIN SODIUM 1400 UNIT(S)/HR: 5000 INJECTION INTRAVENOUS; SUBCUTANEOUS at 07:04

## 2021-09-05 RX ADMIN — CARVEDILOL PHOSPHATE 25 MILLIGRAM(S): 80 CAPSULE, EXTENDED RELEASE ORAL at 22:44

## 2021-09-05 RX ADMIN — Medication 40 MILLIEQUIVALENT(S): at 09:07

## 2021-09-05 RX ADMIN — Medication 15 UNIT(S): at 09:08

## 2021-09-05 NOTE — PROGRESS NOTE ADULT - SUBJECTIVE AND OBJECTIVE BOX
Cardiology PA Adult Progress Note    Subjective Assessment: Pt seen and evaluated at bedside. States he feels well and understands the plan for right/left heart cath on tuesday.   	  MEDICATIONS:  carvedilol 25 milliGRAM(s) Oral every 12 hours  furosemide   Injectable 40 milliGRAM(s) IV Push two times a day  sacubitril 49 mG/valsartan 51 mG 1 Tablet(s) Oral two times a day  cyclobenzaprine 5 milliGRAM(s) Oral three times a day PRN  DULoxetine 60 milliGRAM(s) Oral daily  pantoprazole    Tablet 40 milliGRAM(s) Oral before breakfast  atorvastatin 80 milliGRAM(s) Oral at bedtime  dextrose 40% Gel 15 Gram(s) Oral once  dextrose 50% Injectable 25 Gram(s) IV Push once  dextrose 50% Injectable 12.5 Gram(s) IV Push once  dextrose 50% Injectable 25 Gram(s) IV Push once  glucagon  Injectable 1 milliGRAM(s) IntraMuscular once  insulin glargine Injectable (LANTUS) 45 Unit(s) SubCutaneous at bedtime  insulin lispro (ADMELOG) corrective regimen sliding scale   SubCutaneous Before meals and at bedtime  insulin lispro Injectable (ADMELOG) 15 Unit(s) SubCutaneous three times a day before meals  aspirin enteric coated 81 milliGRAM(s) Oral daily  clopidogrel Tablet 75 milliGRAM(s) Oral daily  dextrose 5%. 1000 milliLiter(s) IV Continuous <Continuous>  dextrose 5%. 1000 milliLiter(s) IV Continuous <Continuous>  heparin   Injectable 6000 Unit(s) IV Push every 6 hours PRN  heparin  Infusion.  Unit(s)/Hr IV Continuous <Continuous>  influenza   Vaccine 0.5 milliLiter(s) IntraMuscular once      PHYSICAL EXAM:  TELEMETRY:  T(C): 36.2 (09-05-21 @ 04:37), Max: 36.9 (09-04-21 @ 22:14)  HR: 70 (09-05-21 @ 00:46) (64 - 78)  BP: 125/72 (09-05-21 @ 00:46) (105/62 - 128/81)  RR: 16 (09-05-21 @ 06:01) (16 - 18)  SpO2: 96% (09-05-21 @ 06:01) (91% - 100%)  Wt(kg): --  I&O's Summary    04 Sep 2021 07:01  -  05 Sep 2021 07:00  --------------------------------------------------------  IN: 911 mL / OUT: 2575 mL / NET: -1664 mL      Appearance: Normal; obese	  HEENT:   Normal oral mucosa, PERRL, EOMI	  Neck: unable to assess JVD 2/2 body habitus  Cardiovascular: Normal S1 S2, No JVD, No murmurs,   Respiratory: Lungs clear to auscultation  Gastrointestinal:  Soft, Non-tender, + BS	  Skin: No rashes, No ecchymoses, No cyanosis  Extremities: Normal range of motion, No clubbing, cyanosis; 1+ pitting edema B/L   Neurologic: Non-focal  Psychiatry: A & O x 3, Mood & affect appropriate      LABS:	 	  CARDIAC MARKERS:                        13.9   7.24  )-----------( 233      ( 05 Sep 2021 05:58 )             43.5     137  |  101  |  27<H>  ----------------------------<  146<H>  3.6   |  28  |  1.70<H>    Ca    9.1      05 Sep 2021 05:58  Mg     1.8     09-05    TPro  6.2  /  Alb  3.3  /  TBili  1.1  /  DBili  x   /  AST  16  /  ALT  13  /  AlkPhos  53  09-04  PT/INR - ( 04 Sep 2021 08:47 )   PT: 16.3 sec;   INR: 1.38     PTT - ( 05 Sep 2021 05:58 )  PTT:76.1 sec

## 2021-09-05 NOTE — PROGRESS NOTE ADULT - PROBLEM SELECTOR PLAN 8
--Pt has never followed up after being diagnosed w/ TOMASA to start CPAP therapy   --Ordered CPAP as pt willing to try while admitted. Will need Pulm f/u for CPAP as outpt        DVT ppx: Lovenox   GI: Pantoprazole   PT eval: home no needs   Dispo: pending clinical progression; likely cardiac cath tuesday 9/7/21.

## 2021-09-05 NOTE — CONSULT NOTE ADULT - ATTENDING COMMENTS
See the Fellow's note written above, for details. I reviewed the fellow documentation.  I have personally seen and examined this patient today. I have reviewed vitals, labs, medications, and imaging. I agree with the fellow's findings and plans as written above with the following additions/amendments:    Patient seen and examined at bedside. Discussed possibility of ELSA, weighed pros and cons. Otherwise feeling well, no issues, denies CP, palpitaitons, N/V/D, Abdominal pain, LE edema    .  VITAL SIGNS:  T(F): 98 (09-05-21 @ 18:13), Max: 98.4 (09-04-21 @ 22:14)  HR: 66 (09-05-21 @ 20:45) (60 - 76)  BP: 122/69 (09-05-21 @ 20:45) (114/66 - 134/77)  BP(mean): --  RR: 18 (09-05-21 @ 20:45) (16 - 18)  SpO2: 95% (09-05-21 @ 20:45) (94% - 100%)    PHYSICAL EXAM:  Constitutional: Resting comfortably in bed; NAD  HEENT:  EOMI, anicteric sclera; MMM  Respiratory: CTA B/L; no W/R/R, no accessory muscle use  Cardiac: +S1/S2; RRR; no M/R/G  Gastrointestinal: soft, NT/ND; no rebound or guarding; +BS  Extremities: WWP, no clubbing or cyanosis; no peripheral edema  Dermatologic: skin warm, dry and intact; no rashes, wounds, or scars      ANGEL LUIS on CKD - close to range, may be from diuresis, would hold off on further diuretic    Consider Poseidon protocol with pre-cath fluids guided by RHC pressures:   5 ml/kg/hr for LVEDP <13 mmHg; 3 ml/kg/hr for 13-18 mmHg; 1.5 ml/kg/hr for >18 mmHg    Terrence risk score:  26.1 % - Risk of any post-PCI ELSA  1.09 % - Risk of post-PCI ELSA requiring dialysis

## 2021-09-05 NOTE — PROGRESS NOTE ADULT - PROBLEM SELECTOR PLAN 3
--Baseline Cr ~1.4-1.7, currently 1.70 (1.38 -> 1.58 -> 1.70).   --Avoid nephrotoxic agents, renally dose meds, monitor UOP and daily BMP.  --Holding Entresto given ANGEL LUIS; Holding IV diuresis given ANGEL LUIS (last Lasix 40mg IV BID 9/5/21 AM). --Baseline Cr ~1.4-1.7, currently 1.70 (1.38 -> 1.58 -> 1.70).   --Avoid nephrotoxic agents, renally dose meds, monitor UOP and daily BMP.  --Holding IV diuresis given ANGEL LUIS (last Lasix 40mg IV BID 9/5/21 AM).  --Consult renal due to development of ANGEL LUIS and pending dye load w/ cardiac cath

## 2021-09-05 NOTE — PROGRESS NOTE ADULT - PROBLEM SELECTOR PLAN 6
--CONT: Coreg 25mg PO BID; HOLDING Entresto and Lasix given ANGEL LUIS. --CONT: Coreg 25mg PO BID, Entresto 49/51mg PO BID.

## 2021-09-05 NOTE — PROGRESS NOTE ADULT - PROBLEM SELECTOR PLAN 2
--Remains asymptomatic and HD stable; Trop T 0.30 -> 0.30 -> 0.34 -> 0.30.    --EKG: SR w/ development of TWI in II/III/aVF, V2-V6 (NOW RESOLVED back to baseline EKG)  --LHC (11/2020):RPDA 70% (FFR negative; large vessel, supplying large area of posterior/lateral wall).   --Initiated Heparin gtt 8:30am on 9/4/21 (x48hrs; discontinue 9/6/21 AM)    --CONT: ASA 81mg PO QD, Plavix 75mg PO QD, Atorvastatin 80mg PO QD, Coreg 25mg PO BID.    --PLAN: cardiac catheterization once more euvolemic (tentatively Tuesday 9/7/21 pending clinical progression).

## 2021-09-05 NOTE — PROGRESS NOTE ADULT - PROBLEM SELECTOR PLAN 4
--TTE w/ bicuspid valve w/ moderate AS (gradients/ZAINAB listed above in TTE report).    --No need for structural heart consult at this time.

## 2021-09-05 NOTE — PROGRESS NOTE ADULT - PROBLEM SELECTOR PLAN 1
--SpO2 96% on RA; no orthopnea; lungs CTA; 2+ LE edema; I/O: -4.2L this admission.  --TTE (9/4/21): LVEF 30-35%, severe hypokinesis inferior and lateral regions, moderate hypokinesis remaining regions, bicuspid aortic valve w/ moderate AS (mean gradient 33.50, peak 60.53, ZAINAB 1.13), mild-mod AR.  --Hold further diuresis at this time 2/2 development of ANGEL LUIS (Cr 1.38 -> 1.58 -> 1.70); hold Entresto given ANGEL LUIS; monitor resolution of ANGEL LUIS, as pt will get dye load on Tuesday w/ C.   --PLAN: monitor volume status; R/L Heart cath on tuesday 9/7/21. --SpO2 96% on RA; no orthopnea; lungs CTA; 2+ LE edema; I/O: -4.2L this admission.  --TTE (9/4/21): LVEF 30-35%, severe hypokinesis inferior and lateral regions, moderate hypokinesis remaining regions, bicuspid aortic valve w/ moderate AS (mean gradient 33.50, peak 60.53, ZAINAB 1.13), mild-mod AR.  --Hold further diuresis at this time 2/2 development of ANGEL LUIS (Cr 1.38 -> 1.58 -> 1.70); monitor resolution of ANGEL LUIS, as pt will get dye load on Tuesday w/ [facility].   --PLAN: monitor volume status; Holding Lasix tonight, f/u need for diuresis tomorrow; R/L Heart cath on tuesday 9/7/21.

## 2021-09-05 NOTE — CONSULT NOTE ADULT - ASSESSMENT
Assessment/Plan:   62 y/o morbidly obese Male with PMHx of CKD3 (baseline 1.4-1.7), HTN, HLD, IDDM c/b retinopathy, HFpEF w/ newly reduced EF, s/p AICD, non-obstructive CAD, ?Bicuspid AS, Moderate AS and TOMASA (not on CPAP) who presents with HF exacerbation in setting of reduced EF. Now s/p diuresis with plan for cardiac cath. Renal consulted.    CKD3- likely related to uncontrolled DM with hx of retinopathy; significant CV hx as well  Does not have nephrologist- please give referral on discharge    Baseline creat- 1.4-1.7; fluctuating within range  Would decrease lasix to daily doses as pt appears euvolaemic on RA at this time  Plan for cardiac cath on Tuesday; will consider further cessation of diuretics vs small volumes of fluid perioperatively    Check UA, UrNa, Urea, Osm, Creat, prot/creat ratio  Get renal bladder sono    BP stable, Hgb at goal, lytes acceptable  Renal diet, avoid nephrotoxic meds, renally dose meds, strict I&Os    Thank you for the opportunity to participate in the care of your patient. The nephrology service remains available to assist with any questions or concerns. Please feel free to reach us by paging the on-call nephrology fellow for urgent issues or as below.     Zakia Turpin M.D.   PGY-5  Pager: [phone]

## 2021-09-05 NOTE — PROGRESS NOTE ADULT - ASSESSMENT
60yo morbidly obese Male w/ FHx of MI (parents), and PMHx HTN, HLD, DM T2 c/b retinopathy, Hx of HFpEF w/ newly reduced EF (30-35%), s/p AICD ([employer], placed for unclear reason), non-obs CAD (RPDA 70% FFR (-) 11/2020), Bicuspic Aortic valve w/ moderate AS, TOMASA (not on CPAP), CKD III (baseline Cr 1.4-1.7) who was transferred from [facility] w/ CHF exacerbation and ACS.    --CHF: Net neg 4.2L; ANGEL LUIS w/ Cr 1.38 --> 1.58 --> 1.70; holding further diuresis at this time; R heart cath tuesday 9/7/21.   --ACS: trop T peak 0.34; TWI development inferolateral leads; heparin gtt started 9/4/21; plan for L heart cath tuesday 9/7/21.   60yo morbidly obese Male w/ FHx of MI (parents), and PMHx HTN, HLD, DM T2 c/b retinopathy, Hx of HFpEF w/ newly reduced EF (30-35%), s/p AICD ([employer], placed for unclear reason), non-obs CAD (RPDA 70% FFR (-) 11/2020), Bicuspic Aortic valve w/ moderate AS, TOMASA (not on CPAP), CKD III (baseline Cr 1.4-1.7) who was transferred from [facility] w/ CHF exacerbation and ACS. CHF: Net neg 4.2L; ANGEL LUIS w/ Cr 1.38 --> 1.58 --> 1.70; holding further diuresis at this time given ANGEL LUIS; R heart cath tuesday 9/7/21. ACS: trop T peak 0.34; TWI development inferolateral leads; heparin gtt started 9/4/21; plan for L heart cath tuesday 9/7/21. ANGEL LUIS: Cr uptrending; renal consulted given pending dye load w/ cath.    --ASA III; Mallampati IV.   --VSS.   --Pt is a candidate for moderate sedation.   --LOAD: s/p ASA/Plavix load; continue ASA 81mg PO QD and Plavix 75mg PO QD.   --FLUIDS: none given patient's CHF exacerbation.     Risks & benefits of procedure and alternative therapy have been explained to the patient including but not limited to: allergic reaction, bleeding w/possible need for blood transfusion, infection, renal and vascular compromise, limb damage, arrhythmia, stroke, vessel dissection/perforation, Myocardial infarction, emergent CABG. Informed consent obtained and in chart.

## 2021-09-06 LAB
ANION GAP SERPL CALC-SCNC: 8 MMOL/L — SIGNIFICANT CHANGE UP (ref 5–17)
APPEARANCE UR: CLEAR — SIGNIFICANT CHANGE UP
APTT BLD: 66.1 SEC — HIGH (ref 27.5–35.5)
BACTERIA # UR AUTO: PRESENT /HPF
BILIRUB UR-MCNC: NEGATIVE — SIGNIFICANT CHANGE UP
BUN SERPL-MCNC: 26 MG/DL — HIGH (ref 7–23)
CALCIUM SERPL-MCNC: 8.8 MG/DL — SIGNIFICANT CHANGE UP (ref 8.4–10.5)
CHLORIDE SERPL-SCNC: 104 MMOL/L — SIGNIFICANT CHANGE UP (ref 96–108)
CO2 SERPL-SCNC: 27 MMOL/L — SIGNIFICANT CHANGE UP (ref 22–31)
COLOR SPEC: YELLOW — SIGNIFICANT CHANGE UP
CREAT ?TM UR-MCNC: 263 MG/DL — SIGNIFICANT CHANGE UP
CREAT SERPL-MCNC: 1.65 MG/DL — HIGH (ref 0.5–1.3)
DIFF PNL FLD: ABNORMAL
EPI CELLS # UR: SIGNIFICANT CHANGE UP /HPF (ref 0–5)
GLUCOSE BLDC GLUCOMTR-MCNC: 103 MG/DL — HIGH (ref 70–99)
GLUCOSE BLDC GLUCOMTR-MCNC: 106 MG/DL — HIGH (ref 70–99)
GLUCOSE BLDC GLUCOMTR-MCNC: 67 MG/DL — LOW (ref 70–99)
GLUCOSE BLDC GLUCOMTR-MCNC: 99 MG/DL — SIGNIFICANT CHANGE UP (ref 70–99)
GLUCOSE SERPL-MCNC: 107 MG/DL — HIGH (ref 70–99)
GLUCOSE UR QL: NEGATIVE — SIGNIFICANT CHANGE UP
HCT VFR BLD CALC: 42.4 % — SIGNIFICANT CHANGE UP (ref 39–50)
HGB BLD-MCNC: 13.2 G/DL — SIGNIFICANT CHANGE UP (ref 13–17)
KETONES UR-MCNC: NEGATIVE — SIGNIFICANT CHANGE UP
LEUKOCYTE ESTERASE UR-ACNC: NEGATIVE — SIGNIFICANT CHANGE UP
MAGNESIUM SERPL-MCNC: 1.9 MG/DL — SIGNIFICANT CHANGE UP (ref 1.6–2.6)
MCHC RBC-ENTMCNC: 24.7 PG — LOW (ref 27–34)
MCHC RBC-ENTMCNC: 31.1 GM/DL — LOW (ref 32–36)
MCV RBC AUTO: 79.4 FL — LOW (ref 80–100)
NITRITE UR-MCNC: NEGATIVE — SIGNIFICANT CHANGE UP
NRBC # BLD: 0 /100 WBCS — SIGNIFICANT CHANGE UP (ref 0–0)
OSMOLALITY UR: 793 MOSM/KG — SIGNIFICANT CHANGE UP (ref 300–900)
PH UR: 6 — SIGNIFICANT CHANGE UP (ref 5–8)
PLATELET # BLD AUTO: 216 K/UL — SIGNIFICANT CHANGE UP (ref 150–400)
POTASSIUM SERPL-MCNC: 4 MMOL/L — SIGNIFICANT CHANGE UP (ref 3.5–5.3)
POTASSIUM SERPL-SCNC: 4 MMOL/L — SIGNIFICANT CHANGE UP (ref 3.5–5.3)
PROT ?TM UR-MCNC: 20 MG/DL — HIGH (ref 0–12)
PROT UR-MCNC: ABNORMAL MG/DL
RBC # BLD: 5.34 M/UL — SIGNIFICANT CHANGE UP (ref 4.2–5.8)
RBC # FLD: 15.4 % — HIGH (ref 10.3–14.5)
RBC CASTS # UR COMP ASSIST: ABNORMAL /HPF
SODIUM SERPL-SCNC: 139 MMOL/L — SIGNIFICANT CHANGE UP (ref 135–145)
SODIUM UR-SCNC: 66 MMOL/L — SIGNIFICANT CHANGE UP
SP GR SPEC: 1.02 — SIGNIFICANT CHANGE UP (ref 1–1.03)
UROBILINOGEN FLD QL: 1 E.U./DL — SIGNIFICANT CHANGE UP
WBC # BLD: 6.14 K/UL — SIGNIFICANT CHANGE UP (ref 3.8–10.5)
WBC # FLD AUTO: 6.14 K/UL — SIGNIFICANT CHANGE UP (ref 3.8–10.5)
WBC UR QL: < 5 /HPF — SIGNIFICANT CHANGE UP

## 2021-09-06 PROCEDURE — 99232 SBSQ HOSP IP/OBS MODERATE 35: CPT

## 2021-09-06 PROCEDURE — 93010 ELECTROCARDIOGRAM REPORT: CPT

## 2021-09-06 RX ORDER — ENOXAPARIN SODIUM 100 MG/ML
40 INJECTION SUBCUTANEOUS EVERY 12 HOURS
Refills: 0 | Status: DISCONTINUED | OUTPATIENT
Start: 2021-09-06 | End: 2021-09-06

## 2021-09-06 RX ORDER — SACUBITRIL AND VALSARTAN 24; 26 MG/1; MG/1
1 TABLET, FILM COATED ORAL ONCE
Refills: 0 | Status: COMPLETED | OUTPATIENT
Start: 2021-09-06 | End: 2021-09-06

## 2021-09-06 RX ORDER — HEPARIN SODIUM 5000 [USP'U]/ML
7500 INJECTION INTRAVENOUS; SUBCUTANEOUS EVERY 8 HOURS
Refills: 0 | Status: DISCONTINUED | OUTPATIENT
Start: 2021-09-06 | End: 2021-09-08

## 2021-09-06 RX ADMIN — CARVEDILOL PHOSPHATE 25 MILLIGRAM(S): 80 CAPSULE, EXTENDED RELEASE ORAL at 21:50

## 2021-09-06 RX ADMIN — Medication 15 UNIT(S): at 13:11

## 2021-09-06 RX ADMIN — Medication 81 MILLIGRAM(S): at 12:37

## 2021-09-06 RX ADMIN — ATORVASTATIN CALCIUM 80 MILLIGRAM(S): 80 TABLET, FILM COATED ORAL at 22:29

## 2021-09-06 RX ADMIN — HEPARIN SODIUM 1400 UNIT(S)/HR: 5000 INJECTION INTRAVENOUS; SUBCUTANEOUS at 06:52

## 2021-09-06 RX ADMIN — INSULIN GLARGINE 45 UNIT(S): 100 INJECTION, SOLUTION SUBCUTANEOUS at 22:29

## 2021-09-06 RX ADMIN — DULOXETINE HYDROCHLORIDE 60 MILLIGRAM(S): 30 CAPSULE, DELAYED RELEASE ORAL at 12:37

## 2021-09-06 RX ADMIN — PANTOPRAZOLE SODIUM 40 MILLIGRAM(S): 20 TABLET, DELAYED RELEASE ORAL at 06:03

## 2021-09-06 RX ADMIN — CARVEDILOL PHOSPHATE 25 MILLIGRAM(S): 80 CAPSULE, EXTENDED RELEASE ORAL at 09:22

## 2021-09-06 RX ADMIN — CLOPIDOGREL BISULFATE 75 MILLIGRAM(S): 75 TABLET, FILM COATED ORAL at 12:37

## 2021-09-06 RX ADMIN — SACUBITRIL AND VALSARTAN 1 TABLET(S): 24; 26 TABLET, FILM COATED ORAL at 12:37

## 2021-09-06 RX ADMIN — HEPARIN SODIUM 7500 UNIT(S): 5000 INJECTION INTRAVENOUS; SUBCUTANEOUS at 16:00

## 2021-09-06 NOTE — PROGRESS NOTE ADULT - ASSESSMENT
60 y/o morbidly obese Male with PMHx of CKD3 (baseline 1.4-1.7), HTN, HLD, IDDM c/b retinopathy, HFpEF w/ newly reduced EF, s/p AICD, non-obstructive CAD, ?Bicuspid AS, Moderate AS and TOMASA (not on CPAP) who presents with HF exacerbation in setting of reduced EF. Now s/p diuresis with plan for cardiac cath. Renal consulted.    CKD3- likely related to uncontrolled DM with hx of retinopathy; significant CV hx as well  Does not have nephrologist- please give referral on discharge    Baseline creat- 1.4-1.7; fluctuating within range  Hold lasix  Based on Poseidon protocol give saline @400mL/hr for 1hr prior to cardiac cath  Then based on LVEDP <13 (5mL/kr/hr) , 13-18 (3mL/kg/hr), >18 (1.5ml/kg/hr) for 4 hours post cath    Check UA, UrNa, Urea, Osm, Creat, prot/creat ratio  Get renal bladder sono  BP stable, Hgb at goal, lytes acceptable    Renal diet, avoid nephrotoxic meds, renally dose meds, strict I&Os

## 2021-09-06 NOTE — PROGRESS NOTE ADULT - PROBLEM SELECTOR PLAN 3
--Baseline Cr ~1.4-1.7, currently 1.70 (1.38 -> 1.58 -> 1.70).   --Avoid nephrotoxic agents, renally dose meds, monitor UOP and daily BMP.  --Holding IV diuresis given ANGEL LUIS (last Lasix 40mg IV BID 9/5/21 AM).  --Consult renal due to development of ANGEL LUIS and pending dye load w/ cardiac cath --Baseline Cr ~1.4-1.7. Uptrending Crea 1.38 -> 1.58 -> 1.70.   - Stable and within baseline range, now downtrending crea 1.65  --Avoid nephrotoxic agents, renally dose meds, monitor UOP and daily BMP.  --Holding further IV diuresis as now euvolemic (last Lasix 40mg IV BID 9/5/21 AM).  --Renal following given uptrending crea and pending dye load w/ cardiac cath  -Pending UA, UrNa, Urea, Osm, Creat, prot/creat ratio  -Obtain bladder scan PVR

## 2021-09-06 NOTE — PROGRESS NOTE ADULT - ASSESSMENT
62yo morbidly obese Male w/ FHx of MI (parents), and PMHx HTN, HLD, DMT2 c/b retinopathy, HFrEF (EF 30-35%), s/p BiV AICD ([employer]), non-obstructive CAD (RPDA 70% FFR (-) 11/2020), Bicuspid Aortic valve w/ moderate AS, TOMASA (not on CPAP), CKD III (baseline Cr 1.4-1.7) who was transferred from [facility] w/ acute on chronic HFrEF exacerbation and NSTEMI. S/p IV diuresis, now euvolemic. Trop T peak 0.34; TWI development inferolateral leads; Heparin gtt started 9/4/21; plan for L heart cath 9/7/21. ANGEL LUIS: Cr uptrending; renal consulted given pending dye load w/ cath. R heart cath tuesday 9/7/21.     --ASA III; Mallampati IV.   --VSS.   --Pt is a candidate for moderate sedation.   --LOAD: s/p ASA/Plavix load; continue ASA 81mg PO QD and Plavix 75mg PO QD.   --FLUIDS: none given patient's CHF exacerbation.     Risks & benefits of procedure and alternative therapy have been explained to the patient including but not limited to: allergic reaction, bleeding w/possible need for blood transfusion, infection, renal and vascular compromise, limb damage, arrhythmia, stroke, vessel dissection/perforation, Myocardial infarction, emergent CABG. Informed consent obtained and in chart.  60yo morbidly obese Male w/ FHx of MI (parents), and PMHx HTN, HLD, DMT2 c/b retinopathy, HFrEF (EF 30-35%), s/p BiV AICD ([employer]), non-obstructive CAD (RPDA 70% FFR (-) 11/2020), Bicuspid Aortic valve w/ moderate AS, TOMASA (not on CPAP), CKD III (baseline Cr 1.4-1.7) who was transferred from [facility] w/ acute on chronic HFrEF exacerbation and NSTEMI. S/p IV diuresis, now euvolemic. Trop T peak 0.34; EKG w/ TWI inferolateral leads -old; started Heparin gtt 9/4/21. Renal consulted given uptrending crea and pending dye load w/ cath. Plan for L/R heart cath Tuesday 9/7/21.

## 2021-09-06 NOTE — PROGRESS NOTE ADULT - PROBLEM SELECTOR PLAN 1
--SpO2 96% on RA; no orthopnea; lungs CTA; 2+ LE edema; I/O: -4.2L this admission.  --TTE (9/4/21): LVEF 30-35%, severe hypokinesis inferior and lateral regions, moderate hypokinesis remaining regions, bicuspid aortic valve w/ moderate AS (mean gradient 33.50, peak 60.53, ZAINAB 1.13), mild-mod AR.  --Hold further diuresis at this time 2/2 development of ANGEL LUIS (Cr 1.38 -> 1.58 -> 1.70); monitor resolution of ANGEL LUIS, as pt will get dye load on Tuesday w/ [facility].   --PLAN: monitor volume status; Holding Lasix tonight, f/u need for diuresis tomorrow; R/L Heart cath on tuesday 9/7/21. --SpO2 96% on RA; no orthopnea; lungs CTA; 2+ LE edema; Net negative 4.8L this admission.  --TTE (9/4/21): LVEF 30-35%, severe hypokinesis inferior and lateral regions, moderate hypokinesis remaining regions, bicuspid aortic valve w/ moderate AS (mean gradient 33.50, peak 60.53, ZAINAB 1.13), mild-mod AR.  - Takes Lasix 40mg PO QD at home  - S/p diuresis w/ IV Lasix 40mg BID, now euvolemic able to lay flat in bed w/o SOB/desaturation  --Hold further diuresis at this time 2/2 uptrending Crea 1.38 -> 1.58 -> 1.70); monitor crea, as pt will get dye load on Tuesday w/ [facility].   -c/w Entresto 49/51mg BID and Coreg 25mg BID as discussed w/ Dr Mccurdy  --PLAN for L/R Heart cath on Tuesday 9/7/21 w/ Dr Quiroz

## 2021-09-06 NOTE — PROGRESS NOTE ADULT - PROBLEM SELECTOR PLAN 8
--Pt has never followed up after being diagnosed w/ TOMASA to start CPAP therapy   --Ordered CPAP as pt willing to try while admitted. Will need Pulm f/u for CPAP as outpt        DVT ppx: Lovenox   GI: Pantoprazole   PT eval: home no needs   Dispo: pending clinical progression; likely cardiac cath tuesday 9/7/21. --Pt has never followed up after being diagnosed w/ TOMASA to start CPAP therapy   --Ordered CPAP as pt willing to try while admitted. Will need Pulm f/u for CPAP as outpt     DVT ppx: Lovenox SQ  GI: Pantoprazole   PT eval: no skilled PT needs. Will benefit from cardiac rehab at discharge  Dispo: pending cardiac cath tuesday 9/7/21. --Pt has never followed up after being diagnosed w/ TOMASA to start CPAP therapy   --Ordered CPAP as pt willing to try while admitted. Will need Pulm f/u for CPAP as outpt     DVT ppx: HSQ  GI: Pantoprazole   PT eval: no skilled PT needs. Will benefit from cardiac rehab at discharge  Dispo: pending cardiac cath tuesday 9/7/21.

## 2021-09-06 NOTE — PROGRESS NOTE ADULT - PROBLEM SELECTOR PLAN 5
--A1c 6.9 on arrival. Pt on Tresiba 12u BID, Basaglar 80u QHS, and Trulicity at home   --Ordered for Lantus 45u QHS, Lispro 15u TID, and FS/mISS. --HgA1c 6.9. Pt on Tresiba 12u BID, Basaglar 80u QHS, and Trulicity at home   - FS 100s  --Ordered for Lantus 45u QHS, Lispro 15u TID, mISS.

## 2021-09-06 NOTE — PROGRESS NOTE ADULT - PROBLEM SELECTOR PLAN 2
--Remains asymptomatic and HD stable; Trop T 0.30 -> 0.30 -> 0.34 -> 0.30.    --EKG: SR w/ development of TWI in II/III/aVF, V2-V6 (NOW RESOLVED back to baseline EKG)  --LHC (11/2020): RPDA 70% (FFR negative; large vessel, supplying large area of posterior/lateral wall).   --Initiated Heparin gtt 8:30am on 9/4/21 (x48hrs; discontinue 9/6/21 AM)    --CONT: ASA 81mg PO QD, Plavix 75mg PO QD, Atorvastatin 80mg PO QD, Coreg 25mg PO BID.    --PLAN: cardiac catheterization once more euvolemic (tentatively Tuesday 9/7/21 pending clinical progression). --Remains asymptomatic and HD stable; Trop T peaked 0.34  --EKG: NSR w/ TWI in II/III/aVF, V2-V6 (noted previously on outpt EKG, intermittently paced on EKG)  --[facility] (11/2020): RPDA 70% (FFR negative; large vessel, supplying large area of posterior/lateral wall).   --Completed Heparin gtt x 48hrs on 9/6/21 AM)    --CONT: ASA 81mg PO QD, Plavix 75mg PO QD, Atorvastatin 80mg PO QD, Coreg 25mg PO BID.    --Plan for L/R cardiac catheterization Tuesday 9/7/21

## 2021-09-06 NOTE — PROGRESS NOTE ADULT - SUBJECTIVE AND OBJECTIVE BOX
CARDIOLOGY NP PROGRESS NOTE    Subjective: Pt seen and examined at bedside. Reports feeling well and less SOB, able to ambulate in hallway yesterday w/o SOB. Denies chest pain, lightheadedness, dizziness, palpitations, fever, chills, n/v.  Remainder ROS otherwise negative.    Overnight Events: None    TELEMETRY: sinus w/ V-Pacing 60s, frequent PVCs. NSVT x 8 beats overnight -asymptomatic         VITAL SIGNS:  T(C): 36.3 (09-06-21 @ 04:37), Max: 37.1 (09-05-21 @ 21:42)  HR: 56 (09-06-21 @ 06:16) (56 - 70)  BP: 112/65 (09-06-21 @ 06:16) (108/65 - 146/87)  RR: 18 (09-06-21 @ 06:16) (16 - 18)  SpO2: 93% (09-06-21 @ 06:16) (93% - 100%)  Wt(kg): --    I&O's Summary    05 Sep 2021 07:01  -  06 Sep 2021 07:00  --------------------------------------------------------  IN: 1044 mL / OUT: 1800 mL / NET: -756 mL          PHYSICAL EXAM:    Appearance: Normal; morbidly obese	  HEENT:   Normal oral mucosa  Neck: supple no JVD  Cardiovascular: Normal S1 S2, No JVD, No murmurs  Respiratory: Lungs clear to auscultation  Gastrointestinal: Soft, Non-tender, + BS	  Skin: No rashes, No ecchymoses, No cyanosis  Extremities: Normal range of motion, No clubbing, cyanosis; resolved aurelio LE edema  Vascular: 2+ radial pulse aurelio, 2+ DP pulses aurelio, 1+ PT pulses aurelio, no femoral bruits aurelio  Neurologic: Non-focal  Psychiatry: A & O x 3, Mood & affect appropriate          LABS:                          13.2   6.14  )-----------( 216      ( 06 Sep 2021 05:49 )             42.4                              09-06    139  |  104  |  26<H>  ----------------------------<  107<H>  4.0   |  27  |  1.65<H>    Ca    8.8      06 Sep 2021 05:49  Mg     1.9     09-06      PTT - ( 06 Sep 2021 05:49 )  PTT:66.1 sec  CAPILLARY BLOOD GLUCOSE      POCT Blood Glucose.: 103 mg/dL (06 Sep 2021 06:57)  POCT Blood Glucose.: 168 mg/dL (05 Sep 2021 21:41)  POCT Blood Glucose.: 102 mg/dL (05 Sep 2021 17:27)  POCT Blood Glucose.: 80 mg/dL (05 Sep 2021 12:39)    CARDIAC MARKERS ( 04 Sep 2021 15:08 )  x     / 0.30 ng/mL / 99 U/L / x     / 3.3 ng/mL          Allergies:  No Known Allergies    MEDICATIONS  (STANDING):  aspirin enteric coated 81 milliGRAM(s) Oral daily  atorvastatin 80 milliGRAM(s) Oral at bedtime  carvedilol 25 milliGRAM(s) Oral every 12 hours  clopidogrel Tablet 75 milliGRAM(s) Oral daily  dextrose 40% Gel 15 Gram(s) Oral once  dextrose 5%. 1000 milliLiter(s) (50 mL/Hr) IV Continuous <Continuous>  dextrose 5%. 1000 milliLiter(s) (100 mL/Hr) IV Continuous <Continuous>  dextrose 50% Injectable 25 Gram(s) IV Push once  dextrose 50% Injectable 12.5 Gram(s) IV Push once  dextrose 50% Injectable 25 Gram(s) IV Push once  DULoxetine 60 milliGRAM(s) Oral daily  glucagon  Injectable 1 milliGRAM(s) IntraMuscular once  heparin  Infusion.  Unit(s)/Hr (10 mL/Hr) IV Continuous <Continuous>  insulin glargine Injectable (LANTUS) 45 Unit(s) SubCutaneous at bedtime  insulin lispro (ADMELOG) corrective regimen sliding scale   SubCutaneous Before meals and at bedtime  insulin lispro Injectable (ADMELOG) 15 Unit(s) SubCutaneous three times a day before meals  pantoprazole    Tablet 40 milliGRAM(s) Oral before breakfast  sacubitril 49 mG/valsartan 51 mG 1 Tablet(s) Oral two times a day    MEDICATIONS  (PRN):  cyclobenzaprine 5 milliGRAM(s) Oral three times a day PRN Muscle Spasm  heparin   Injectable 6000 Unit(s) IV Push every 6 hours PRN For aPTT less than 40        DIAGNOSTIC TESTS:        CARDIOLOGY NP PROGRESS NOTE    Subjective: Pt seen and examined at bedside. Reports feeling well and less SOB, able to ambulate in hallway yesterday w/o SOB. Denies chest pain, lightheadedness, dizziness, palpitations, fever, chills, n/v.  Remainder ROS otherwise negative.    Overnight Events: None    TELEMETRY: sinus w/ V-Pacing 60s, frequent PVCs. NSVT x 8 beats overnight - asymptomatic         VITAL SIGNS:  T(C): 36.3 (09-06-21 @ 04:37), Max: 37.1 (09-05-21 @ 21:42)  HR: 56 (09-06-21 @ 06:16) (56 - 70)  BP: 112/65 (09-06-21 @ 06:16) (108/65 - 146/87)  RR: 18 (09-06-21 @ 06:16) (16 - 18)  SpO2: 93% (09-06-21 @ 06:16) (93% - 100%)  Wt(kg): --    I&O's Summary    05 Sep 2021 07:01  -  06 Sep 2021 07:00  --------------------------------------------------------  IN: 1044 mL / OUT: 1800 mL / NET: -756 mL          PHYSICAL EXAM:    Appearance: Normal; morbidly obese	  HEENT:   Normal oral mucosa  Neck: supple no JVD  Cardiovascular: Normal S1 S2, No JVD, No murmurs  Respiratory: Lungs clear to auscultation  Gastrointestinal: Soft, Non-tender, + BS	  Skin: No rashes, No ecchymoses, No cyanosis  Extremities: Normal range of motion, No clubbing, cyanosis; resolved aurelio LE edema  Vascular: 2+ radial pulse aurelio, 2+ DP pulses aurelio, 1+ PT pulses aurelio, no femoral bruits aurelio  Neurologic: Non-focal  Psychiatry: A & O x 3, Mood & affect appropriate          LABS:                          13.2   6.14  )-----------( 216      ( 06 Sep 2021 05:49 )             42.4                              09-06    139  |  104  |  26<H>  ----------------------------<  107<H>  4.0   |  27  |  1.65<H>    Ca    8.8      06 Sep 2021 05:49  Mg     1.9     09-06      PTT - ( 06 Sep 2021 05:49 )  PTT:66.1 sec  CAPILLARY BLOOD GLUCOSE      POCT Blood Glucose.: 103 mg/dL (06 Sep 2021 06:57)  POCT Blood Glucose.: 168 mg/dL (05 Sep 2021 21:41)  POCT Blood Glucose.: 102 mg/dL (05 Sep 2021 17:27)  POCT Blood Glucose.: 80 mg/dL (05 Sep 2021 12:39)    CARDIAC MARKERS ( 04 Sep 2021 15:08 )  x     / 0.30 ng/mL / 99 U/L / x     / 3.3 ng/mL          Allergies:  No Known Allergies    MEDICATIONS  (STANDING):  aspirin enteric coated 81 milliGRAM(s) Oral daily  atorvastatin 80 milliGRAM(s) Oral at bedtime  carvedilol 25 milliGRAM(s) Oral every 12 hours  clopidogrel Tablet 75 milliGRAM(s) Oral daily  dextrose 40% Gel 15 Gram(s) Oral once  dextrose 5%. 1000 milliLiter(s) (50 mL/Hr) IV Continuous <Continuous>  dextrose 5%. 1000 milliLiter(s) (100 mL/Hr) IV Continuous <Continuous>  dextrose 50% Injectable 25 Gram(s) IV Push once  dextrose 50% Injectable 12.5 Gram(s) IV Push once  dextrose 50% Injectable 25 Gram(s) IV Push once  DULoxetine 60 milliGRAM(s) Oral daily  glucagon  Injectable 1 milliGRAM(s) IntraMuscular once  heparin  Infusion.  Unit(s)/Hr (10 mL/Hr) IV Continuous <Continuous>  insulin glargine Injectable (LANTUS) 45 Unit(s) SubCutaneous at bedtime  insulin lispro (ADMELOG) corrective regimen sliding scale   SubCutaneous Before meals and at bedtime  insulin lispro Injectable (ADMELOG) 15 Unit(s) SubCutaneous three times a day before meals  pantoprazole    Tablet 40 milliGRAM(s) Oral before breakfast  sacubitril 49 mG/valsartan 51 mG 1 Tablet(s) Oral two times a day    MEDICATIONS  (PRN):  cyclobenzaprine 5 milliGRAM(s) Oral three times a day PRN Muscle Spasm  heparin   Injectable 6000 Unit(s) IV Push every 6 hours PRN For aPTT less than 40        DIAGNOSTIC TESTS:

## 2021-09-06 NOTE — PROGRESS NOTE ADULT - SUBJECTIVE AND OBJECTIVE BOX
Patient is a 61y Male seen and evaluated at bedside. Creat within baseline. Diuretics held today. ELSA risk reduction strategies listed below.    Meds:    aspirin enteric coated 81 daily  atorvastatin 80 at bedtime  carvedilol 25 every 12 hours  clopidogrel Tablet 75 daily  cyclobenzaprine 5 three times a day PRN  dextrose 40% Gel 15 once  dextrose 5%. 1000 <Continuous>  dextrose 5%. 1000 <Continuous>  dextrose 50% Injectable 25 once  dextrose 50% Injectable 12.5 once  dextrose 50% Injectable 25 once  DULoxetine 60 daily  glucagon  Injectable 1 once  heparin   Injectable 7500 every 8 hours  insulin glargine Injectable (LANTUS) 45 at bedtime  insulin lispro (ADMELOG) corrective regimen sliding scale  Before meals and at bedtime  insulin lispro Injectable (ADMELOG) 15 three times a day before meals  pantoprazole    Tablet 40 before breakfast      T(C): , Max: 37.1 (09-05-21 @ 21:42)  T(F): , Max: 98.7 (09-05-21 @ 21:42)  HR: 62 (09-06-21 @ 12:35)  BP: 120/70 (09-06-21 @ 12:35)  BP(mean): 84 (09-06-21 @ 12:35)  RR: 19 (09-06-21 @ 12:35)  SpO2: 98% (09-06-21 @ 12:35)  Wt(kg): --    09-05 @ 07:01  -  09-06 @ 07:00  --------------------------------------------------------  IN: 1044 mL / OUT: 1800 mL / NET: -756 mL    09-06 @ 07:01  -  09-06 @ 15:56  --------------------------------------------------------  IN: 180 mL / OUT: 0 mL / NET: 180 mL      Review of Systems:  RESPIRATORY: No shortness of breath  CARDIOVASCULAR: No chest pain   MUSCULOSKELETAL: No leg oedema    PHYSICAL EXAM:  GENERAL: obese, NAD, on RA  NECK: No JVD  CHEST/LUNG: Clear to auscultation bilaterally  HEART: normal S1S2, RRR  ABDOMEN: Soft, Nontender  EXTREMITIES: No oedema     LABS:                        13.2   6.14  )-----------( 216      ( 06 Sep 2021 05:49 )             42.4     09-06    139  |  104  |  26<H>  ----------------------------<  107<H>  4.0   |  27  |  1.65<H>    Ca    8.8      06 Sep 2021 05:49  Mg     1.9     09-06        PTT - ( 06 Sep 2021 05:49 )  PTT:66.1 sec          RADIOLOGY & ADDITIONAL STUDIES:

## 2021-09-07 ENCOUNTER — TRANSCRIPTION ENCOUNTER (OUTPATIENT)
Age: 61
End: 2021-09-07

## 2021-09-07 ENCOUNTER — NON-APPOINTMENT (OUTPATIENT)
Age: 61
End: 2021-09-07

## 2021-09-07 LAB
ANION GAP SERPL CALC-SCNC: 8 MMOL/L — SIGNIFICANT CHANGE UP (ref 5–17)
APTT BLD: 35.5 SEC — SIGNIFICANT CHANGE UP (ref 27.5–35.5)
BUN SERPL-MCNC: 24 MG/DL — HIGH (ref 7–23)
CALCIUM SERPL-MCNC: 9 MG/DL — SIGNIFICANT CHANGE UP (ref 8.4–10.5)
CHLORIDE SERPL-SCNC: 104 MMOL/L — SIGNIFICANT CHANGE UP (ref 96–108)
CO2 SERPL-SCNC: 27 MMOL/L — SIGNIFICANT CHANGE UP (ref 22–31)
CREAT SERPL-MCNC: 1.63 MG/DL — HIGH (ref 0.5–1.3)
GLUCOSE BLDC GLUCOMTR-MCNC: 110 MG/DL — HIGH (ref 70–99)
GLUCOSE BLDC GLUCOMTR-MCNC: 125 MG/DL — HIGH (ref 70–99)
GLUCOSE BLDC GLUCOMTR-MCNC: 77 MG/DL — SIGNIFICANT CHANGE UP (ref 70–99)
GLUCOSE BLDC GLUCOMTR-MCNC: 93 MG/DL — SIGNIFICANT CHANGE UP (ref 70–99)
GLUCOSE SERPL-MCNC: 113 MG/DL — HIGH (ref 70–99)
HCT VFR BLD CALC: 44.5 % — SIGNIFICANT CHANGE UP (ref 39–50)
HGB BLD-MCNC: 13.6 G/DL — SIGNIFICANT CHANGE UP (ref 13–17)
INR BLD: 1.26 — HIGH (ref 0.88–1.16)
MAGNESIUM SERPL-MCNC: 1.9 MG/DL — SIGNIFICANT CHANGE UP (ref 1.6–2.6)
MCHC RBC-ENTMCNC: 24.7 PG — LOW (ref 27–34)
MCHC RBC-ENTMCNC: 30.6 GM/DL — LOW (ref 32–36)
MCV RBC AUTO: 80.8 FL — SIGNIFICANT CHANGE UP (ref 80–100)
NRBC # BLD: 0 /100 WBCS — SIGNIFICANT CHANGE UP (ref 0–0)
PLATELET # BLD AUTO: 222 K/UL — SIGNIFICANT CHANGE UP (ref 150–400)
POTASSIUM SERPL-MCNC: 4 MMOL/L — SIGNIFICANT CHANGE UP (ref 3.5–5.3)
POTASSIUM SERPL-SCNC: 4 MMOL/L — SIGNIFICANT CHANGE UP (ref 3.5–5.3)
PROTHROM AB SERPL-ACNC: 15 SEC — HIGH (ref 10.6–13.6)
RBC # BLD: 5.51 M/UL — SIGNIFICANT CHANGE UP (ref 4.2–5.8)
RBC # FLD: 15.7 % — HIGH (ref 10.3–14.5)
SARS-COV-2 RNA SPEC QL NAA+PROBE: NEGATIVE — SIGNIFICANT CHANGE UP
SODIUM SERPL-SCNC: 139 MMOL/L — SIGNIFICANT CHANGE UP (ref 135–145)
WBC # BLD: 7.91 K/UL — SIGNIFICANT CHANGE UP (ref 3.8–10.5)
WBC # FLD AUTO: 7.91 K/UL — SIGNIFICANT CHANGE UP (ref 3.8–10.5)

## 2021-09-07 PROCEDURE — 93010 ELECTROCARDIOGRAM REPORT: CPT

## 2021-09-07 PROCEDURE — 99223 1ST HOSP IP/OBS HIGH 75: CPT

## 2021-09-07 PROCEDURE — 93460 R&L HRT ART/VENTRICLE ANGIO: CPT | Mod: 26

## 2021-09-07 PROCEDURE — 99232 SBSQ HOSP IP/OBS MODERATE 35: CPT

## 2021-09-07 RX ORDER — SPIRONOLACTONE 25 MG/1
25 TABLET, FILM COATED ORAL DAILY
Refills: 0 | Status: DISCONTINUED | OUTPATIENT
Start: 2021-09-08 | End: 2021-09-08

## 2021-09-07 RX ORDER — ISOSORBIDE DINITRATE 5 MG/1
5 TABLET ORAL ONCE
Refills: 0 | Status: COMPLETED | OUTPATIENT
Start: 2021-09-07 | End: 2021-09-07

## 2021-09-07 RX ORDER — SODIUM CHLORIDE 9 MG/ML
250 INJECTION INTRAMUSCULAR; INTRAVENOUS; SUBCUTANEOUS
Refills: 0 | Status: DISCONTINUED | OUTPATIENT
Start: 2021-09-07 | End: 2021-09-08

## 2021-09-07 RX ORDER — HYDRALAZINE HCL 50 MG
10 TABLET ORAL THREE TIMES A DAY
Refills: 0 | Status: DISCONTINUED | OUTPATIENT
Start: 2021-09-07 | End: 2021-09-08

## 2021-09-07 RX ORDER — MAGNESIUM OXIDE 400 MG ORAL TABLET 241.3 MG
400 TABLET ORAL ONCE
Refills: 0 | Status: COMPLETED | OUTPATIENT
Start: 2021-09-07 | End: 2021-09-07

## 2021-09-07 RX ORDER — HYDRALAZINE HCL 50 MG
10 TABLET ORAL ONCE
Refills: 0 | Status: COMPLETED | OUTPATIENT
Start: 2021-09-07 | End: 2021-09-07

## 2021-09-07 RX ORDER — ISOSORBIDE DINITRATE 5 MG/1
5 TABLET ORAL THREE TIMES A DAY
Refills: 0 | Status: DISCONTINUED | OUTPATIENT
Start: 2021-09-07 | End: 2021-09-08

## 2021-09-07 RX ADMIN — HEPARIN SODIUM 7500 UNIT(S): 5000 INJECTION INTRAVENOUS; SUBCUTANEOUS at 22:19

## 2021-09-07 RX ADMIN — CARVEDILOL PHOSPHATE 25 MILLIGRAM(S): 80 CAPSULE, EXTENDED RELEASE ORAL at 22:18

## 2021-09-07 RX ADMIN — Medication 15 UNIT(S): at 18:59

## 2021-09-07 RX ADMIN — CLOPIDOGREL BISULFATE 75 MILLIGRAM(S): 75 TABLET, FILM COATED ORAL at 07:23

## 2021-09-07 RX ADMIN — CARVEDILOL PHOSPHATE 25 MILLIGRAM(S): 80 CAPSULE, EXTENDED RELEASE ORAL at 09:48

## 2021-09-07 RX ADMIN — PANTOPRAZOLE SODIUM 40 MILLIGRAM(S): 20 TABLET, DELAYED RELEASE ORAL at 06:57

## 2021-09-07 RX ADMIN — ISOSORBIDE DINITRATE 5 MILLIGRAM(S): 5 TABLET ORAL at 22:19

## 2021-09-07 RX ADMIN — SODIUM CHLORIDE 50 MILLILITER(S): 9 INJECTION INTRAMUSCULAR; INTRAVENOUS; SUBCUTANEOUS at 10:59

## 2021-09-07 RX ADMIN — HEPARIN SODIUM 7500 UNIT(S): 5000 INJECTION INTRAVENOUS; SUBCUTANEOUS at 00:09

## 2021-09-07 RX ADMIN — HEPARIN SODIUM 7500 UNIT(S): 5000 INJECTION INTRAVENOUS; SUBCUTANEOUS at 09:48

## 2021-09-07 RX ADMIN — ATORVASTATIN CALCIUM 80 MILLIGRAM(S): 80 TABLET, FILM COATED ORAL at 22:18

## 2021-09-07 RX ADMIN — DULOXETINE HYDROCHLORIDE 60 MILLIGRAM(S): 30 CAPSULE, DELAYED RELEASE ORAL at 10:58

## 2021-09-07 RX ADMIN — Medication 81 MILLIGRAM(S): at 07:23

## 2021-09-07 RX ADMIN — MAGNESIUM OXIDE 400 MG ORAL TABLET 400 MILLIGRAM(S): 241.3 TABLET ORAL at 09:47

## 2021-09-07 RX ADMIN — Medication 10 MILLIGRAM(S): at 18:58

## 2021-09-07 RX ADMIN — INSULIN GLARGINE 45 UNIT(S): 100 INJECTION, SOLUTION SUBCUTANEOUS at 22:20

## 2021-09-07 NOTE — DISCHARGE NOTE PROVIDER - HOSPITAL COURSE
60yo morbidly obese Male w/ FHx of MI (parents), and PMHx HTN, HLD, DMT2 c/b retinopathy, HFrEF (EF 30-35%), s/p BiV AICD (Medtronic), non-obstructive CAD (RPDA 70% FFR (-) 11/2020), Bicuspid Aortic valve w/ moderate AS, TOMASA (not on CPAP), CKD III (baseline Cr 1.4-1.7) who was transferred from [facility] w/ acute on chronic HFrEF exacerbation and NSTEMI. S/p IV diuresis, now euvolemic. Trop T peak 0.34; EKG w/ TWI inferolateral leads -old; completed Heparin gtt x 48hrs 9/6/21. Renal consulted given CKD 3 and pending dye load w/ cath. Plan for L/R heart cath Tuesday 9/7/21.     s/p diagnostic cath w/ LM normal, mid LAD 40%, prox LCx 30%, distal LCx 60% (small caliber), prox Ramus 40%, mid RCA 40%, RPDA 70% (unchanged), EDP 20 mmHg, mild AS by cath. Right heart cath w/ RA 8, RY 51/3/9, PA 53/24, mid PAP 35, PCWP 18, CO/CI 5.4/2.09, PA sat 66%. Right TR at 8:15. Right brachial exchanged for 14G. 60yo morbidly obese Male w/ FHx of MI (parents), and PMHx HTN, HLD, DMT2 c/b retinopathy, HFrEF (EF 30-35%), s/p BiV AICD (Medtronic), non-obstructive CAD (RPDA 70% FFR (-) 11/2020), Bicuspid Aortic valve w/ moderate AS, TOMASA (not on CPAP), CKD III (baseline Cr 1.4-1.7) who presented to [facility] c/o SOB and aurelio LE, admitted for acute on chronic HFrEF exacerbation likely in setting of dietary non-compliance. Pt subsequently had Echo on 9/3/21 which revealed newly reduced EF to 35% from 53% (on Stress Echo in 10/2020) and probable moderate AS. In light of newly depressed EF pt transferred to [facility] for cardiac cath. Pt was diuresed w/ IV Lasix 40mg BID and reached euvolemia. Trop T peak 0.34; EKG w/ TWI inferolateral leads -seen in previous EKGs; completed Heparin gtt x 48hrs on 9/6/21. Renal was consulted given CKD 3 and pending dye load w/ cath. s/p diagnostic L/R heart cath w/ LM normal, mid LAD 40%, prox LCx 30%, distal LCx 60% (small caliber), prox Ramus 40%, mid RCA 40%, RPDA 70% (unchanged), EDP 20 mmHg, mild AS by cath. Right heart cath w/ RA 8, RY 51/3/9, PA 53/24, mid PAP 35, PCWP 18, CO/CI 5.4/2.09, PA sat 66%. Right radial and Right brachial venous access w/o hematoma. 62yo morbidly obese Male w/ FHx of MI (parents), and PMHx HTN, HLD, DMT2 c/b retinopathy, HFrEF (EF 30-35%), s/p BiV AICD (Medtronic), non-obstructive CAD (RPDA 70% FFR (-) 11/2020), Bicuspid Aortic valve w/ moderate AS, TOMASA (not on CPAP), CKD III (baseline Cr 1.4-1.7) who presented to [facility] c/o SOB and aurelio LE, admitted for acute on chronic HFrEF exacerbation likely in setting of dietary non-compliance. Pt subsequently had Echo on 9/3/21 which revealed newly reduced EF to 35% from 53% (on Stress Echo in 10/2020) and probable moderate AS. In light of newly depressed EF pt transferred to [facility] for cardiac cath. Pt was diuresed w/ IV Lasix 40mg BID and reached euvolemia. Trop T peak 0.34; EKG w/ TWI inferolateral leads -seen in previous EKGs; completed Heparin gtt x 48hrs on 9/6/21. Renal was consulted given CKD 3 and pending dye load w/ cath. s/p diagnostic L/R heart cath w/ LM normal, mid LAD 40%, prox LCx 30%, distal LCx 60% (small caliber), prox Ramus 40%, mid RCA 40%, RPDA 70% (unchanged), EDP 20 mmHg, mild AS by cath. Right heart cath w/ RA 8, RY 51/3/9, PA 53/24, mid PAP 35, PCWP 18, CO/CI 5.4/2.09, PA sat 66%. Right radial and Right brachial venous access w/o hematoma.      Pt was seen and examined at bedside, denies any complaints of chest pain, dizziness, SOB, palpitations, pain, LE edema, fever and/or chills. Right radial/groin access site soft, no bleeding or swelling at site, radial pulse 2+, DP/PT pulses at baseline. No events on tele overnight, VSS, Labs unremarkable/stable, Physical exam WNL. Pt was seen and examined by cardiology attending as well and is deemed stable for discharge per  .   Pt is to continue ASA/Plavix, Atorvastatin. Pt is to follow up with cardiologist Dr. Morrison in 1-2 weeks for post discharge check-up. Pt instructed to return to ED/seek immediate medical attention if symptoms of chest pain, SOB, LOC, bleeding from the access site. Pt agrees with the discharge plan, verbalizes understanding of the information given. All medications explained risks/side effects and e-prescribed to patient preferred pharmacy. 62 yo morbidly obese Male w/ FHx of MI (parents), and PMHx HTN, HLD, DMT2 c/b retinopathy, HFrEF (EF 30-35%), s/p BiV AICD (Medtronic), non-obstructive CAD (RPDA 70% FFR (-) 11/2020), Bicuspid Aortic valve w/ moderate AS, TOMASA (not on CPAP), CKD III (baseline Cr 1.4-1.7) who presented to [facility] c/o SOB and aurelio LE, admitted for acute on chronic HFrEF exacerbation likely in setting of dietary non-compliance. Pt subsequently had Echo on 9/3/21 which revealed newly reduced EF to 35% from 53% (on Stress Echo in 10/2020) and probable moderate AS. In light of newly depressed EF pt transferred to [facility] for cardiac cath. Pt was diuresed w/ IV Lasix 40mg BID and reached euvolemia. Trop T peak 0.34; EKG w/ TWI inferolateral leads -seen in previous EKGs; completed Heparin gtt x 48hrs on 9/6/21. Renal was consulted given CKD 3 and pending dye load w/ cath. s/p diagnostic L/R heart cath w/ LM normal, mid LAD 40%, prox LCx 30%, distal LCx 60% (small caliber), prox Ramus 40%, mid RCA 40%, RPDA 70% (unchanged), EDP 20 mmHg, mild AS by cath. Right heart cath w/ RA 8, RY 51/3/9, PA 53/24, mid PAP 35, PCWP 18, CO/CI 5.4/2.09, PA sat 66%. Right radial and Right brachial venous access w/o hematoma.      Pt was seen and examined at bedside, denies any complaints of chest pain, dizziness, SOB, palpitations, pain, LE edema, fever and/or chills. Right radial/groin access site soft, no bleeding or swelling at site, radial pulse 2+, DP/PT pulses at baseline. No events on tele overnight, VSS, Labs unremarkable/stable, Physical exam WNL. Pt was seen and examined by cardiology attending as well and is deemed stable for discharge per  .   Pt is to continue ASA/Plavix, Atorvastatin. Pt is to follow up with cardiologist Dr. Morrison in 1-2 weeks for post discharge check-up. Pt instructed to return to ED/seek immediate medical attention if symptoms of chest pain, SOB, LOC, bleeding from the access site. Pt agrees with the discharge plan, verbalizes understanding of the information given. All medications explained risks/side effects and e-prescribed to patient preferred pharmacy. 62 yo morbidly obese Male w/ FHx of MI (parents), and PMHx HTN, HLD, DMT2 c/b retinopathy, HFrEF (EF 30-35%), s/p BiV AICD (Medtronic), non-obstructive CAD (RPDA 70% FFR (-) 11/2020), Bicuspid Aortic valve w/ moderate AS, TOMASA (not on CPAP), CKD III (baseline Cr 1.4-1.7) who presented to [facility] c/o SOB and aurelio LE, admitted for acute on chronic HFrEF exacerbation likely in setting of dietary non-compliance. Pt subsequently had Echo on 9/3/21 which revealed newly reduced EF to 35% from 53% (on Stress Echo in 10/2020) and probable moderate AS. In light of newly depressed EF pt transferred to [facility] for cardiac cath. Pt was diuresed w/ IV Lasix 40mg BID and reached euvolemia. Trop T peak 0.34; EKG w/ TWI inferolateral leads -seen in previous EKGs; completed Heparin gtt x 48hrs on 9/6/21. Renal was consulted given CKD 3 and pending dye load w/ cath, crea remained stable at baseline 1.5. S/p diagnostic L/R heart cath w/ LM normal, mid LAD 40%, prox LCx 30%, distal LCx 60% (small caliber), prox Ramus 40%, mid RCA 40%, RPDA 70% (unchanged), EDP 20 mmHg, mild AS by cath. Right heart cath w/ RA 8, RY 51/3/9, PA 53/24, mid PAP 35, PCWP 18, CO/CI 5.4/2.09, PA sat 66%. Right radial and Right brachial venous access w/o hematoma. CT Surgery was consulted for AS. Recommended for F/U with Dr. Sellers out-patient after getting dobutamine stress echo with outpt cardiologist Dr Hawkins in [place]. ECHO (9/4/21) revealed: LVEF 30-35%, severe hypokinesis inferior and lateral regions, moderate hypokinesis remaining regions, bicuspid aortic valve w/ moderate AS (mean gradient 33.50, peak 60.53, ZAINAB 1.13), mild-mod AR. HF regimen at discharge: Entresto 24/26mg BID, Aldactone 25mg qd, Coreg 25mg BID, and Torsemide 40mg qd.      Pt was seen and examined at bedside, denies any complaints of chest pain, dizziness, SOB, palpitations, pain, LE edema, fever and/or chills. Right radial/groin access site soft, no bleeding or swelling at site, radial pulse 2+, DP/PT pulses at baseline. No events on tele overnight, VSS, Labs unremarkable/stable, Physical exam WNL. Pt was seen and examined by cardiology attending as well and is deemed stable for discharge per Dr. Tanner.  Pt is to continue ASA and Atorvastatin. Pt is to follow up with cardiologist Dr. Hawkins in 1-2 weeks for post discharge check-up. Pt instructed to return to ED/seek immediate medical attention if symptoms of chest pain, SOB, LOC, bleeding from the access site. Pt agrees with the discharge plan, verbalizes understanding of the information given. All medications explained risks/side effects and e-prescribed to patient preferred pharmacy.

## 2021-09-07 NOTE — PROGRESS NOTE ADULT - PROBLEM SELECTOR PLAN 5
--HgA1c 6.9. Pt on Tresiba 12u BID, Basaglar 80u QHS, and Trulicity at home   - FS 100s  --Ordered for Lantus 45u QHS, Lispro 15u TID, mISS. --HgA1c 6.9. Pt on Tresiba 12u BID, Basaglar 80u QHS, and Trulicity at home   - FS 100s  --c/w Lantus 45u QHS, Lispro 15u TID, mISS.

## 2021-09-07 NOTE — DISCHARGE NOTE PROVIDER - NSDCFUSCHEDAPPT_GEN_ALL_CORE_FT
DIPESH JOHNSON ; 09/13/2021 ; [facility] Cardio [address] ; 09/14/2021 ; NPP Rheum [address] ; 09/22/2021 ; NPP Endocrin [address] ; 10/04/2021 ; NPP Intmed [address]

## 2021-09-07 NOTE — DISCHARGE NOTE PROVIDER - NSDCCPCAREPLAN_GEN_ALL_CORE_FT
PRINCIPAL DISCHARGE DIAGNOSIS  Diagnosis: Acute on chronic HFrEF (heart failure with reduced ejection fraction)  Assessment and Plan of Treatment:        PRINCIPAL DISCHARGE DIAGNOSIS  Diagnosis: Acute on chronic HFrEF (heart failure with reduced ejection fraction)  Assessment and Plan of Treatment: You were admitted to the cardiac telemetry floor with congestive heart failure.  You were given intravenous Lasix to get rid of the fluid in your lungs and body to help you breathe better. Your water pill Lasix was SWITCHED to Torsemide 40mg once a day. You were started on medication Entresto and Spironolactone to help improve your heart muscle strength. You had an echocardiogram performed that showed your heart muscle is weak, the pumping function of your heart or Ejection Fraction is 30-35%. Your heart is unable to pump the blood effectively throughout your body and can cause fluid backup into your lungs and rest of body. Please maintain a low salt diet and fluid restriction of 1.5 liters per day to prevent from fluid being reaccumulated. Weigh yourself daily and report any weight gain over 2 pounds/day or per week to your Doctor. Please continue taking your medications as prescribed.      SECONDARY DISCHARGE DIAGNOSES  Diagnosis: CAD (coronary artery disease)  Assessment and Plan of Treatment: You had a cardiac catheterization that revealed non-obstructive coronary artery disease. STOP cholesterol medication Simvastatin. Start taking baby aspirin and cholesterol medication Lipitor 80mg once a day to help prevent the plaque buildup from getting worse.    Diagnosis: Aortic stenosis  Assessment and Plan of Treatment: You had a cardiac catheterization that revealed the aortic stenosis, or leakiness of the aortic valve is mild. You were seen by the Cardiac Surgeon Dr Sellers in the hospital. Follow up with your cardiologist Dr Hawkins as you will need to have a Dobutamine Stress Echo in the near future to evaluate the aortic valve. Follow up with Dr Sellers after the stress echo test is performed.    Diagnosis: Stage 3 chronic kidney disease  Assessment and Plan of Treatment: Your kidney level have been stable while in the hospital. You may follow up with Nephrologist Dr Pollard for further monitoring.

## 2021-09-07 NOTE — CONSULT NOTE ADULT - SUBJECTIVE AND OBJECTIVE BOX
Surgeon: Dr. South    Requesting Physician: Dr. Tanner    HISTORY OF PRESENT ILLNESS:  This is a 62 yo morbidly obese M w/ PMHx of HTN, HLD, IDDM c/b retinopathy, HFpEF w/ newly reduced EF (currently ~35% by echo at [place] on 9/3/21, previously 53% by stress echo in 10/2020), s/p AICD (Medtronic, placed for unclear reasons), non-obstructive CAD (RPDA 70%, FFR negative by cath in 2020), TOMASA (not on CPAP), CKD Stage 3 (baseline Cr 1.4-1.7), bicuspid AV and moderate AS who initially presented to [facility] on 21 for SOB and LE edema, found to have elevated BNP and Trop T (suspected to be 2/2 demand ischemia from CHF exacerbation), pulmonary congestion on CXR, and treated w/ IV diuresis for CHF exacerbation.  He subsequently had an echo which revealed newly reduced EF to 35% and probable moderate AS.  He was transferred to [facility] for further workup and management.  Since admission he has been diuresed, now euvolemic and stable for cardiac cath.  He is currently undergoing R&LHC and to check AV gradient.  Dr. South is now consulted for evaluation of his valvular disease.      PAST MEDICAL & SURGICAL HISTORY:  Chronic heart failure with preserved ejection fraction (HFpEF)    HTN (hypertension)    HLD (hyperlipidemia)    DM (diabetes mellitus)    TOMASA (obstructive sleep apnea)    Stage 3 chronic kidney disease    CAD (coronary artery disease)    History of appendectomy    MEDICATIONS  (STANDING):  aspirin enteric coated 81 milliGRAM(s) Oral daily  atorvastatin 80 milliGRAM(s) Oral at bedtime  carvedilol 25 milliGRAM(s) Oral every 12 hours  clopidogrel Tablet 75 milliGRAM(s) Oral daily  dextrose 40% Gel 15 Gram(s) Oral once  dextrose 5%. 1000 milliLiter(s) (50 mL/Hr) IV Continuous <Continuous>  dextrose 5%. 1000 milliLiter(s) (100 mL/Hr) IV Continuous <Continuous>  dextrose 50% Injectable 25 Gram(s) IV Push once  dextrose 50% Injectable 12.5 Gram(s) IV Push once  dextrose 50% Injectable 25 Gram(s) IV Push once  DULoxetine 60 milliGRAM(s) Oral daily  glucagon  Injectable 1 milliGRAM(s) IntraMuscular once  heparin   Injectable 7500 Unit(s) SubCutaneous every 8 hours  hydrALAZINE 10 milliGRAM(s) Oral once  hydrALAZINE 10 milliGRAM(s) Oral three times a day  insulin glargine Injectable (LANTUS) 45 Unit(s) SubCutaneous at bedtime  insulin lispro (ADMELOG) corrective regimen sliding scale   SubCutaneous Before meals and at bedtime  insulin lispro Injectable (ADMELOG) 15 Unit(s) SubCutaneous three times a day before meals  isosorbide   dinitrate Tablet (ISORDIL) 5 milliGRAM(s) Oral once  isosorbide   dinitrate Tablet (ISORDIL) 5 milliGRAM(s) Oral three times a day  pantoprazole    Tablet 40 milliGRAM(s) Oral before breakfast  sodium chloride 0.9%. 250 milliLiter(s) (50 mL/Hr) IV Continuous <Continuous>    MEDICATIONS  (PRN):  cyclobenzaprine 5 milliGRAM(s) Oral three times a day PRN Muscle Spasm      Allergies    No Known Allergies    Intolerances      SOCIAL HISTORY:  Smoker:  NO    ETOH use: NO           Ilicit Drug use: NO  Occupation:  Assisted device use (Cane / Walker):  Live with:    FAMILY HISTORY:  FHx: myocardial infarction (Father, Mother)        Review of Systems:  CONSTITUTIONAL: Denies fevers / chills, sweats, fatigue, weight loss, weight gain                                       NEURO:  Denies paraesthesias, seizures, syncope, confusion                                                                                  EYES:  Denies blurry vision, discharge, pain, loss of vision                                                                                    ENMT:  Denies difficulty hearing, vertigo, dysphagia, epistaxis, recent dental work                                       CV:  Denies chest pain, palpitations, CELESTIN, orthopnea                                                                                           RESPIRATORY: +SOB Denies wheezing, cough / sputum, hemoptysis                                                               GI:  Denies nausea, vomiting, diarrhea, constipation, melena                                                                          : Denies hematuria, dysuria, urgency, incontinence                                                                                          MUSCULOSKELETAL: +edema  Denies arthritis, joint swelling, muscle weakness                                                             SKIN/BREAST:  Denies rash, itching, hair loss, masses                                                                                              PSYCH:  Denies depression, anxiety, suicidal ideation                                                                                                HEME/LYMPH:  Denies bruises easily, enlarged lymph nodes, tender lymph nodes                                          ENDOCRINE:  Denies cold intolerance, heat intolerance, polydipsia                                                                      Vital Signs Last 24 Hrs  T(C): 36.3 (07 Sep 2021 13:47), Max: 36.8 (06 Sep 2021 18:11)  T(F): 97.4 (07 Sep 2021 13:47), Max: 98.3 (06 Sep 2021 18:11)  HR: 64 (07 Sep 2021 12:34) (63 - 68)  BP: 148/76 (07 Sep 2021 12:34) (118/74 - 167/81)  BP(mean): --  RR: 18 (07 Sep 2021 12:34) (16 - 20)  SpO2: 96% (07 Sep 2021 12:34) (94% - 97%)    Physical Exam  Appearance: No acute distress.  Neurologic: AAOx3, no AMS or focal deficits.  Responds appropriately to verbal and physical stimuli; exhibits purposeful movement in all extremities.  HEENT:   MMM, PERRLA, EOMI b/l  Neck: Supple, no JVD; no carotid Bruit   Cardiovascular: RRR, S1 S2. No m/r/g.  Respiratory: No acute respiratory distress. CTA b/l, no w/r/r.   Gastrointestinal:  Soft, non-tender, non-distended, + BS.	  Skin: No rashes. No ecchymoses. No cyanosis.  Extremities: Exhibits normal range of motion, no clubbing, cyanosis or edema.  Vascular: Peripheral pulses palpable 2+ bilaterally.                                                           LABS:                        13.6   7.91  )-----------( 222      ( 07 Sep 2021 07:55 )             44.5         139  |  104  |  24<H>  ----------------------------<  113<H>  4.0   |  27  |  1.63<H>    Ca    9.0      07 Sep 2021 07:53  Mg     1.9     09-07      PT/INR - ( 07 Sep 2021 07:53 )   PT: 15.0 sec;   INR: 1.26          PTT - ( 07 Sep 2021 07:53 )  PTT:35.5 sec  Urinalysis Basic - ( 06 Sep 2021 21:05 )    Color: Yellow / Appearance: Clear / S.025 / pH: x  Gluc: x / Ketone: NEGATIVE  / Bili: Negative / Urobili: 1.0 E.U./dL   Blood: x / Protein: Trace mg/dL / Nitrite: NEGATIVE   Leuk Esterase: NEGATIVE / RBC: Many /HPF / WBC < 5 /HPF   Sq Epi: x / Non Sq Epi: 0-5 /HPF / Bacteria: Present /HPF    RADIOLOGY & ADDITIONAL STUDIES:  CAROTID U/S:     CXR:   < from: Xray Chest 1 View- PORTABLE-Routine (Xray Chest 1 View- PORTABLE-Routine in AM.) (21 @ 08:14) >  IMPRESSION: No acute infiltrates  < end of copied text >    CT Scan:    EKG:     TTE / ESPERANZA:   < from: TTE Echo Complete w/o Contrast w/ Doppler (21 @ 12:43) >  CONCLUSIONS:   1. There is moderate concentric left ventricular hypertrophy. Left ventricular ejection fraction is 30-35%. Severe hypokinesis of the inferior and lateral regions. Moderate hypokinesis of the remaining regions.   2. The right ventricle is normal in size. Right ventricular systolic function is normal. A device lead is noted in the right heart. Right ventricular hypertrophy is present.   3. The aortic valve is calcified. The aortic valve appears bicuspid. Fusion of the right and left coronary cusps. There is moderate aortic stenosis. The peak transvalvular velocity is 3.89 m/s, the mean transvalvular gradient is 33.50 mmHg, and the LVOT/AV velocity ratio is 0.39. The peak transaortic gradient is 60.53 mmHg. The aortic valve area (estimated via the continuity method) is 1.13 cm². There is mild-to-moderate aortic regurgitation.   4. No evidence of pulmonary hypertension.   5. No pericardial effusion. Epicardial fat noted.   6. The aortic root is mildly dilated. The aortic root measures 4.00 cm at level of the sinuses of Valsalva (normal 3.1-3.7 cm for men, 2.7-3.3 cm for women).   7. No prior echo is available for comparison.  < end of copied text >    Cardiac Cath: pending

## 2021-09-07 NOTE — DISCHARGE NOTE PROVIDER - CARE PROVIDERS DIRECT ADDRESSES
,[email or web address],[email or web address] ,[email or web address],DirectAddress_Unknown,DirectAddress_Unknown

## 2021-09-07 NOTE — CONSULT NOTE ADULT - ASSESSMENT
Assesment:  This is a 60 yo morbidly obese M w/ PMHx of HTN, HLD, IDDM c/b retinopathy, HFpEF w/ newly reduced EF (currently ~35% by echo at [place] on 9/3/21, previously 53% by stress echo in 10/2020), s/p AICD ([employer]tronic, placed for unclear reasons), non-obstructive CAD (RPDA 70%, FFR negative by cath in 11/2020), TOMASA (not on CPAP), CKD Stage 3 (baseline Cr 1.4-1.7), bicuspid AV and moderate AS who initially presented to [facility] on 9/2/21 for SOB and LE edema, found to have elevated BNP and Trop T (suspected to be 2/2 demand ischemia from CHF exacerbation), pulmonary congestion on CXR, and treated w/ IV diuresis for CHF exacerbation.  He subsequently had an echo which revealed newly reduced EF to 35% and probable moderate AS.  He was transferred to [facility] for further workup and management.  Since admission he has been diuresed, now euvolemic and stable for cardiac cath.  He is currently undergoing R&LHC and to check AV gradient.  Dr. South is now consulted for evaluation of his valvular disease.      Plan:  Problem 1: bicuspid AV/moderate AS  -    Problem 2: acute on chronic systolic CHF       Problem 3: CKD      Problem 4: HTN    I have reviewed clinical labs tests and reports, radiology tests and reports, as well as old patient medical records, and discussed with the refering physician.     Assesment:  This is a 60 yo morbidly obese M w/ PMHx of HTN, HLD, IDDM c/b retinopathy, HFpEF w/ newly reduced EF (currently ~35% by echo at [facility] on 9/3/21, previously 53% by stress echo in 10/2020), s/p AICD (Medtronic, placed for unclear reasons), non-obstructive CAD (RPDA 70%, FFR negative by cath in 11/2020), TOMASA (not on CPAP), CKD Stage 3 (baseline Cr 1.4-1.7), bicuspid AV and moderate AS who initially presented to [facility] on 9/2/21 for SOB and LE edema, found to have elevated BNP and Trop T (suspected to be 2/2 demand ischemia from CHF exacerbation), pulmonary congestion on CXR, and treated w/ IV diuresis for CHF exacerbation.  He subsequently had an echo which revealed newly reduced EF to 35% and probable moderate AS.  He was transferred to [facility] for further workup and management.  Since admission he has been diuresed, now euvolemic and stable for cardiac cath.  He is currently undergoing R&LHC and to check AV gradient.  Dr. South is now consulted for evaluation of his valvular disease.      Plan:  Problem 1: bicuspid AV/moderate AS  -s/p cardiac cath with mild AS.  Will discuss case with Dr. South and have Dr. South review cath and echo images   -likely nothing     Problem 2: acute on chronic systolic CHF s/p AICD 3 years ago for unclear reasons, per patient because his heart muscle was weak, did not have cardiac arrest or arrhythmias from what patient recalls  -consider HF consult.   -FU cardiac cath results to evaluate coronaries   -agree with holding lasix in light of cardiac cath today and patient euvolemic   -con't Entresto 49/51mg BID and Coreg 25mg BID     Problem 3: CKD  -Cr currently at baseline   -monitor closely after cath today     Problem 4: HTN  -con't Entresto 49/51mg BID and Coreg 25mg BID     I have reviewed clinical labs tests and reports, radiology tests and reports, as well as old patient medical records, and discussed with the refering physician.

## 2021-09-07 NOTE — PROGRESS NOTE ADULT - PROBLEM SELECTOR PLAN 7
--CONT: Atorvastatin 80mg PO QHS (switched from patient’s home Simvastatin). --CONT: Atorvastatin 80mg PO QHS (switched from patient’s home Simvastatin).  -

## 2021-09-07 NOTE — PROGRESS NOTE ADULT - PROBLEM SELECTOR PROBLEM 8
TOMASA (obstructive sleep apnea)

## 2021-09-07 NOTE — DISCHARGE NOTE PROVIDER - DETAILS OF MALNUTRITION DIAGNOSIS/DIAGNOSES
This patient has been assessed with a concern for Malnutrition and was treated during this hospitalization for the following Nutrition diagnosis/diagnoses:     -  09/04/2021: Morbid obesity (BMI > 40)

## 2021-09-07 NOTE — DISCHARGE NOTE PROVIDER - NSDCMRMEDTOKEN_GEN_ALL_CORE_FT
Ranjana KwikPen 100 units/mL subcutaneous solution: 80 unit(s) subcutaneous once a day  carvedilol 25 mg oral tablet: 1 tab(s) orally 2 times a day  cyclobenzaprine 5 mg oral tablet: 1 tab(s) orally 3 times a day, As Needed  DULoxetine 60 mg oral delayed release capsule: 1 cap(s) orally once a day  Entresto 24 mg-26 mg oral tablet: 1 tab(s) orally 2 times a day  Lasix 40 mg oral tablet: 1 tab(s) orally once a day  simvastatin 20 mg oral tablet: 1 tab(s) orally once a day (at bedtime)  Tresiba 100 units/mL subcutaneous solution: 12 unit(s) subcutaneous 2 times a day  Trulicity Pen 1.5 mg/0.5 mL subcutaneous solution: subcutaneous once a week   aspirin 81 mg oral delayed release tablet: 1 tab(s) orally once a day  atorvastatin 80 mg oral tablet: 1 tab(s) orally once a day (at bedtime)  Basaglar KwikPen 100 units/mL subcutaneous solution: 80 unit(s) subcutaneous once a day  Cardiac rehabilitation. 3 days a week for 12 weeks. Dx acute on chronic HFrEF exacerbation. Outpatient cardiologist Dr Saul Hawkins. [phone]:   carvedilol 25 mg oral tablet: 1 tab(s) orally 2 times a day  cyclobenzaprine 5 mg oral tablet: 1 tab(s) orally 3 times a day, As Needed  DULoxetine 60 mg oral delayed release capsule: 1 cap(s) orally once a day  Entresto 24 mg-26 mg oral tablet: 1 tab(s) orally 2 times a day  pantoprazole 40 mg oral delayed release tablet: 1 tab(s) orally once a day (before a meal)  spironolactone 25 mg oral tablet: 1 tab(s) orally once a day  torsemide 20 mg oral tablet: 2 tab(s) orally once a day  Tresiba 100 units/mL subcutaneous solution: 12 unit(s) subcutaneous 2 times a day  Trulicity Pen 1.5 mg/0.5 mL subcutaneous solution: subcutaneous once a week

## 2021-09-07 NOTE — PROGRESS NOTE ADULT - PROBLEM SELECTOR PLAN 8
--Pt has never followed up after being diagnosed w/ TOMASA to start CPAP therapy   --Ordered CPAP as pt willing to try while admitted. Will need Pulm f/u for CPAP as outpt     DVT ppx: Lovenox SQ  GI: Pantoprazole   PT eval: no skilled PT needs. Will benefit from cardiac rehab at discharge  Dispo: pending cardiac cath tuesday 9/7/21. --Pt has never followed up after being diagnosed w/ TOMASA to start CPAP therapy   --Ordered CPAP as pt willing to try while admitted. Will need Pulm f/u for CPAP as outpt     DVT ppx: Lovenox SQ  GI: Pantoprazole   PT eval: no skilled PT needs. Will benefit from CHF home care vs cardiac rehab at discharge  Dispo: pending cardiac cath today 9/7/21.

## 2021-09-07 NOTE — PROGRESS NOTE ADULT - SUBJECTIVE AND OBJECTIVE BOX
CARDIOLOGY NP PROGRESS NOTE    Subjective: Pt seen and examined at bedside. Reports feeling well. Denies chest pain, sob, lightheadedness, dizziness, palpitations, fever, chills. Remainder ROS otherwise negative.    Overnight Events:     TELEMETRY: Sinus w/ V-Pacing 60s, occasional PVCs         VITAL SIGNS:  T(C): 36.6 (09-07-21 @ 09:41), Max: 36.8 (09-06-21 @ 18:11)  HR: 67 (09-07-21 @ 09:07) (62 - 68)  BP: 129/67 (09-07-21 @ 09:07) (118/74 - 134/79)  RR: 18 (09-07-21 @ 09:07) (16 - 20)  SpO2: 96% (09-07-21 @ 09:07) (94% - 98%)  Wt(kg): --    I&O's Summary    06 Sep 2021 07:01  -  07 Sep 2021 07:00  --------------------------------------------------------  IN: 360 mL / OUT: 1000 mL / NET: -640 mL    07 Sep 2021 07:01  -  07 Sep 2021 10:55  --------------------------------------------------------  IN: 0 mL / OUT: 0 mL / NET: 0 mL          PHYSICAL EXAM:    General: A/ox 3, No acute Distress  Neck: Supple, NO JVD  Cardiac: S1 S2, No M/R/G  Pulmonary: CTAB, Breathing unlabored, No Rhonchi/Rales/Wheezing  Abdomen: Soft, Non -tender, +BS x 4 quads  Extremities: No Rashes, No edema  Neuro: A/o x 3, No focal deficits          LABS:                          13.6   7.91  )-----------( 222      ( 07 Sep 2021 07:55 )             44.5                              09-07    139  |  104  |  24<H>  ----------------------------<  113<H>  4.0   |  27  |  1.63<H>    Ca    9.0      07 Sep 2021 07:53  Mg     1.9     09-07    PT/INR - ( 07 Sep 2021 07:53 )   PT: 15.0 sec;   INR: 1.26          PTT - ( 07 Sep 2021 07:53 )  PTT:35.5 sec  CAPILLARY BLOOD GLUCOSE      POCT Blood Glucose.: 125 mg/dL (07 Sep 2021 06:43)  POCT Blood Glucose.: 106 mg/dL (06 Sep 2021 21:35)  POCT Blood Glucose.: 67 mg/dL (06 Sep 2021 17:07)  POCT Blood Glucose.: 99 mg/dL (06 Sep 2021 12:29)            Allergies:  No Known Allergies    MEDICATIONS  (STANDING):  aspirin enteric coated 81 milliGRAM(s) Oral daily  atorvastatin 80 milliGRAM(s) Oral at bedtime  carvedilol 25 milliGRAM(s) Oral every 12 hours  clopidogrel Tablet 75 milliGRAM(s) Oral daily  dextrose 40% Gel 15 Gram(s) Oral once  dextrose 5%. 1000 milliLiter(s) (50 mL/Hr) IV Continuous <Continuous>  dextrose 5%. 1000 milliLiter(s) (100 mL/Hr) IV Continuous <Continuous>  dextrose 50% Injectable 25 Gram(s) IV Push once  dextrose 50% Injectable 12.5 Gram(s) IV Push once  dextrose 50% Injectable 25 Gram(s) IV Push once  DULoxetine 60 milliGRAM(s) Oral daily  glucagon  Injectable 1 milliGRAM(s) IntraMuscular once  heparin   Injectable 7500 Unit(s) SubCutaneous every 8 hours  insulin glargine Injectable (LANTUS) 45 Unit(s) SubCutaneous at bedtime  insulin lispro (ADMELOG) corrective regimen sliding scale   SubCutaneous Before meals and at bedtime  insulin lispro Injectable (ADMELOG) 15 Unit(s) SubCutaneous three times a day before meals  pantoprazole    Tablet 40 milliGRAM(s) Oral before breakfast  sodium chloride 0.9%. 250 milliLiter(s) (50 mL/Hr) IV Continuous <Continuous>    MEDICATIONS  (PRN):  cyclobenzaprine 5 milliGRAM(s) Oral three times a day PRN Muscle Spasm        DIAGNOSTIC TESTS:        CARDIOLOGY NP PROGRESS NOTE    Subjective: Pt seen and examined at bedside. Reports feeling well. Denies chest pain, sob, lightheadedness, dizziness, palpitations, fever, chills. Remainder ROS otherwise negative.    Overnight Events: None    TELEMETRY: Sinus w/ V-Pacing 60s, occasional PVCs         VITAL SIGNS:  T(C): 36.6 (09-07-21 @ 09:41), Max: 36.8 (09-06-21 @ 18:11)  HR: 67 (09-07-21 @ 09:07) (62 - 68)  BP: 129/67 (09-07-21 @ 09:07) (118/74 - 134/79)  RR: 18 (09-07-21 @ 09:07) (16 - 20)  SpO2: 96% (09-07-21 @ 09:07) (94% - 98%)  Wt(kg): --    I&O's Summary    06 Sep 2021 07:01  -  07 Sep 2021 07:00  --------------------------------------------------------  IN: 360 mL / OUT: 1000 mL / NET: -640 mL    07 Sep 2021 07:01  -  07 Sep 2021 10:55  --------------------------------------------------------  IN: 0 mL / OUT: 0 mL / NET: 0 mL          PHYSICAL EXAM:    Appearance: Normal; morbidly obese	  HEENT:   Normal oral mucosa  Neck: supple, no JVD  Cardiovascular: Normal S1 S2, No JVD, No murmurs  Respiratory: Lungs clear to auscultation  Gastrointestinal: Soft, Non-tender, + BS	  Skin: No rashes, No ecchymoses, No cyanosis  Extremities: Normal range of motion, No clubbing, cyanosis; resolved aurelio LE edema w/ skin wrinkling  Vascular: 2+ radial pulse aurelio, 1+ DP pulses aurelio, 1+ PT pulses aurelio, no femoral bruits aurelio  Neurologic: Non-focal  Psychiatry: A & O x 3, Mood & affect appropriate            LABS:                          13.6   7.91  )-----------( 222      ( 07 Sep 2021 07:55 )             44.5                              09-07    139  |  104  |  24<H>  ----------------------------<  113<H>  4.0   |  27  |  1.63<H>    Ca    9.0      07 Sep 2021 07:53  Mg     1.9     09-07    PT/INR - ( 07 Sep 2021 07:53 )   PT: 15.0 sec;   INR: 1.26          PTT - ( 07 Sep 2021 07:53 )  PTT:35.5 sec  CAPILLARY BLOOD GLUCOSE      POCT Blood Glucose.: 125 mg/dL (07 Sep 2021 06:43)  POCT Blood Glucose.: 106 mg/dL (06 Sep 2021 21:35)  POCT Blood Glucose.: 67 mg/dL (06 Sep 2021 17:07)  POCT Blood Glucose.: 99 mg/dL (06 Sep 2021 12:29)            Allergies:  No Known Allergies    MEDICATIONS  (STANDING):  aspirin enteric coated 81 milliGRAM(s) Oral daily  atorvastatin 80 milliGRAM(s) Oral at bedtime  carvedilol 25 milliGRAM(s) Oral every 12 hours  clopidogrel Tablet 75 milliGRAM(s) Oral daily  dextrose 40% Gel 15 Gram(s) Oral once  dextrose 5%. 1000 milliLiter(s) (50 mL/Hr) IV Continuous <Continuous>  dextrose 5%. 1000 milliLiter(s) (100 mL/Hr) IV Continuous <Continuous>  dextrose 50% Injectable 25 Gram(s) IV Push once  dextrose 50% Injectable 12.5 Gram(s) IV Push once  dextrose 50% Injectable 25 Gram(s) IV Push once  DULoxetine 60 milliGRAM(s) Oral daily  glucagon  Injectable 1 milliGRAM(s) IntraMuscular once  heparin   Injectable 7500 Unit(s) SubCutaneous every 8 hours  insulin glargine Injectable (LANTUS) 45 Unit(s) SubCutaneous at bedtime  insulin lispro (ADMELOG) corrective regimen sliding scale   SubCutaneous Before meals and at bedtime  insulin lispro Injectable (ADMELOG) 15 Unit(s) SubCutaneous three times a day before meals  pantoprazole    Tablet 40 milliGRAM(s) Oral before breakfast  sodium chloride 0.9%. 250 milliLiter(s) (50 mL/Hr) IV Continuous <Continuous>    MEDICATIONS  (PRN):  cyclobenzaprine 5 milliGRAM(s) Oral three times a day PRN Muscle Spasm        DIAGNOSTIC TESTS:

## 2021-09-07 NOTE — PROGRESS NOTE ADULT - NUTRITIONAL ASSESSMENT
This patient has been assessed with a concern for Malnutrition and has been determined to have a diagnosis/diagnoses of Morbid obesity (BMI > 40).    This patient is being managed with:   Diet NPO-  NPO for Procedure/Test     NPO Start Date: 07-Sep-2021   NPO Start Time: 10:41  Except Medications  With Ice Chips/Sips of Water  Entered: Sep  7 2021 10:41AM    Diet DASH/TLC-  Sodium & Cholesterol Restricted  Consistent Carbohydrate {No Snacks} (CSTCHO)  60 gm Protein (PRO60G)  1000mL Fluid Restriction ([id number])  Entered: Sep  6 2021 11:03AM    
This patient has been assessed with a concern for Malnutrition and has been determined to have a diagnosis/diagnoses of Morbid obesity (BMI > 40).    This patient is being managed with:   Diet NPO after Midnight-     NPO Start Date: 06-Sep-2021   NPO Start Time: 23:59  Except Medications  With Ice Chips/Sips of Water  Entered: Sep  6 2021  8:28AM    Diet DASH/TLC-  Sodium & Cholesterol Restricted  Consistent Carbohydrate {No Snacks} (CSTCHO)  1000mL Fluid Restriction ([id number])  Entered: Sep  3 2021  7:16PM    
This patient has been assessed with a concern for Malnutrition and has been determined to have a diagnosis/diagnoses of Morbid obesity (BMI > 40).    This patient is being managed with:   Diet DASH/TLC-  Sodium & Cholesterol Restricted  Consistent Carbohydrate {No Snacks} (CSTCHO)  1000mL Fluid Restriction ([id number])  Entered: Sep  3 2021  7:16PM

## 2021-09-07 NOTE — PROGRESS NOTE ADULT - PROBLEM SELECTOR PLAN 1
--SpO2 96% on RA; no orthopnea; lungs CTA; 2+ LE edema; Net negative 4.8L this admission.  --TTE (9/4/21): LVEF 30-35%, severe hypokinesis inferior and lateral regions, moderate hypokinesis remaining regions, bicuspid aortic valve w/ moderate AS (mean gradient 33.50, peak 60.53, ZAINAB 1.13), mild-mod AR.  - Takes Lasix 40mg PO QD at home  - S/p diuresis w/ IV Lasix 40mg BID, now euvolemic able to lay flat in bed w/o SOB/desaturation  --Hold further diuresis at this time 2/2 uptrending Crea 1.38 -> 1.58 -> 1.70); monitor crea, as pt will get dye load on Tuesday w/ [facility].   -c/w Entresto 49/51mg BID and Coreg 25mg BID as discussed w/ Dr Mccurdy  --PLAN for L/R Heart cath on Tuesday 9/7/21 w/ Dr Quiroz --SpO2 96% on RA; no orthopnea; lungs CTA; 2+ LE edema on admission; Net negative 5.5L this admission.  --TTE (9/4/21): LVEF 30-35%, severe hypokinesis inferior and lateral regions, moderate hypokinesis remaining regions, bicuspid aortic valve w/ moderate AS (mean gradient 33.50, peak 60.53, ZAINAB 1.13), mild-mod AR.  - Takes Lasix 40mg PO QD at home  - S/p diuresis w/ IV Lasix 40mg BID, now euvolemic able to lay flat in bed w/o SOB/desaturation  --Hold further diuresis at this time as pt will get dye load on Tuesday w/ [facility].   -c/w Entresto 49/51mg BID and Coreg 25mg BID   --PLAN for L/R Heart cath w/ AV gradients today 9/7/21 w/ Dr Quiroz

## 2021-09-07 NOTE — PROGRESS NOTE ADULT - TIME BILLING
As above; Coordination of care
Patient transferred from OSH with LVEF 30% and report of severe AS, TTE with moderate AS and mild-mod AI. Currently euvolemic on exam  Plan for  NPO for R/LHC and AS study today 9/7  CTSX evaluation for AS/AI, likely no intervention given mod degree of AS/AI  Hold IV lasix pending result of RHC   Carvedilol 25 BID for CHF GDMT  Entresto/ACE/ARB pending creatinine trend post LHC  Spironolactone 25 mg po daily  Hydralazine 10 TID/Isordil 5 TID for CHF GDMT initiated  Bedside CHF Education, supplies to be given to patient  Advanced CHF consult for assistance with management of decompensated state  EP consult for consideration of AICD/CRT  PT consult pending  Future planning: patient to have 1wk f/u appt made with Cardiologist for quality improvement CHF readmission risk reduction  Case discussed with patient and cardiac care team  Francia Molina M.D.  Cardiology Attending

## 2021-09-07 NOTE — PROGRESS NOTE ADULT - PROBLEM SELECTOR PLAN 6
--CONT: Coreg 25mg PO BID, Entresto 49/51mg PO BID. Normotensive, -120s  --CONT: Coreg 25mg PO BID, Entresto 49/51mg PO BID.

## 2021-09-07 NOTE — PROGRESS NOTE ADULT - PROBLEM SELECTOR PLAN 2
--Remains asymptomatic and HD stable; Trop T peaked 0.34  --EKG: NSR w/ TWI in II/III/aVF, V2-V6 (noted previously on outpt EKG, intermittently paced on EKG)  --[facility] (11/2020): RPDA 70% (FFR negative; large vessel, supplying large area of posterior/lateral wall).   --Completed Heparin gtt x 48hrs on 9/6/21 AM)    --CONT: ASA 81mg PO QD, Plavix 75mg PO QD, Atorvastatin 80mg PO QD, Coreg 25mg PO BID.    --Plan for L/R cardiac catheterization Tuesday 9/7/21 --Remains asymptomatic and HD stable; Trop T peaked 0.34  --EKG: NSR w/ TWI in II/III/aVF, V2-V6 (noted previously on outpt EKG, intermittently paced on EKG)  --[facility] (11/2020): RPDA 70% (FFR negative; large vessel, supplying large area of posterior/lateral wall).   --Completed Heparin gtt x 48hrs on 9/6/21 AM  --CONT: ASA 81mg PO QD, Plavix 75mg PO QD, Atorvastatin 80mg PO QD, Coreg 25mg PO BID.    --Plan for L/R cardiac catheterization w/ AV gradients today, 9/7/21 --Remains asymptomatic and HD stable; Trop T peaked 0.34  --EKG: NSR w/ TWI in II/III/aVF, V2-V6 (noted previously on outpt EKG, intermittently paced on EKG)  --[facility] (11/2020): RPDA 70% (FFR negative; large vessel, supplying large area of posterior/lateral wall).   --Completed Heparin gtt x 48hrs on 9/6/21 AM  - S/p /Plavix 600mg load  --CONT: ASA 81mg PO QD, Plavix 75mg PO QD, Atorvastatin 80mg PO QD, Coreg 25mg PO BID.    --Plan for L/R cardiac catheterization w/ AV gradients today, 9/7/21

## 2021-09-07 NOTE — DISCHARGE NOTE PROVIDER - PROVIDER TOKENS
PROVIDER:[TOKEN:[[id number]],SCHEDULEDAPPT:[09/13/2021],SCHEDULEDAPPTTIME:[09:00 AM],ESTABLISHEDPATIENT:[T]] PROVIDER:[[id number]],SCHEDULEDAPPT:[09/13/2021],SCHEDULEDAPPTTIME:[09:00 AM],ESTABLISHEDPATIENT:[T]],PROVIDER:[TOKEN:[[id number]],FOLLOWUP:[Routine]] PROVIDER:[[id number]],SCHEDULEDAPPT:[09/13/2021],SCHEDULEDAPPTTIME:[09:00 AM],ESTABLISHEDPATIENT:[T]],PROVIDER:[TOKEN:[[id number]],FOLLOWUP:[Routine]],FREE:[LAST:[Pirelli],FIRST:[Davide],PHONE:[phone]],FAX:[(   )    -],ADDRESS:[[address] [place] [address]],FOLLOWUP:[1 month]]

## 2021-09-07 NOTE — PROGRESS NOTE ADULT - PROBLEM SELECTOR PROBLEM 1
Acute on chronic combined systolic and diastolic congestive heart failure

## 2021-09-07 NOTE — PROGRESS NOTE ADULT - PROBLEM SELECTOR PROBLEM 3
Aortic stenosis
Stage 3 chronic kidney disease

## 2021-09-07 NOTE — PROGRESS NOTE ADULT - ASSESSMENT
62yo morbidly obese Male w/ FHx of MI (parents), and PMHx HTN, HLD, DMT2 c/b retinopathy, HFrEF (EF 30-35%), s/p BiV AICD (Medtronic), non-obstructive CAD (RPDA 70% FFR (-) 11/2020), Bicuspid Aortic valve w/ moderate AS, TOMASA (not on CPAP), CKD III (baseline Cr 1.4-1.7) who was transferred from [facility] w/ acute on chronic HFrEF exacerbation and NSTEMI. S/p IV diuresis, now euvolemic. Trop T peak 0.34; EKG w/ TWI inferolateral leads -old; started Heparin gtt 9/4/21. Renal consulted given uptrending crea and pending dye load w/ cath. Plan for L/R heart cath Tuesday 9/7/21.  62yo morbidly obese Male w/ FHx of MI (parents), and PMHx HTN, HLD, DMT2 c/b retinopathy, HFrEF (EF 30-35%), s/p BiV AICD (Medtronic), non-obstructive CAD (RPDA 70% FFR (-) 11/2020), Bicuspid Aortic valve w/ moderate AS, TOMASA (not on CPAP), CKD III (baseline Cr 1.4-1.7) who was transferred from [facility] w/ acute on chronic HFrEF exacerbation and NSTEMI. S/p IV diuresis, now euvolemic. Trop T peak 0.34; EKG w/ TWI inferolateral leads -old; completed Heparin gtt x 48hrs 9/6/21. Renal consulted given CKD 3 and pending dye load w/ cath. Plan for L/R heart cath Tuesday 9/7/21.

## 2021-09-07 NOTE — DISCHARGE NOTE PROVIDER - CARE PROVIDER_API CALL
Saul Hawkins)  Cardiology; Internal Medicine  [address] [place] [address], [address]  [place] [address]  Phone: [phone]  Fax: [phone]  Established Patient  Scheduled Appointment: 09/13/2021 09:00 AM   Saul Hawkins)  Cardiology; Internal Medicine  [address] [place] [address], [address]  [place] [address]  Phone: [phone]  Fax: [phone]  Established Patient  Scheduled Appointment: 09/13/2021 09:00 AM    Alexandra Pollard)  Internal Medicine  [address] [place] [address], [address] [place] [address]  Phone: [phone]  Fax: [phone]  Follow Up Time: Routine   Saul Hawkins)  Cardiology; Internal Medicine  [address] [facility], [address]  [place] [address]  Phone: [phone]  Fax: [phone]  Established Patient  Scheduled Appointment: 09/13/2021 09:00 AM    Alexandra Pollard)  Internal Medicine  [address] [place] [address], [address]  [place] [address]  Phone: [phone]  Fax: [phone]  Follow Up Time: Routine    Davide Sellers  [address] [place] [address]  Phone: [phone]  Fax: (   )    -  Follow Up Time: 1 month

## 2021-09-07 NOTE — PROGRESS NOTE ADULT - PROBLEM SELECTOR PLAN 4
--TTE w/ bicuspid valve w/ moderate AS (gradients/ZAINAB listed above in TTE report).    --No need for structural heart consult at this time. --TTE w/ bicuspid valve w/ moderate AS (gradients/ZAINAB listed above in TTE report).    --Plan for L/R cardiac catheterization w/ AV gradients today, 9/7/21  --No need for structural heart consult at this time.

## 2021-09-07 NOTE — GOALS OF CARE CONVERSATION - ADVANCED CARE PLANNING - CONVERSATION DETAILS
Pt would like to remain FULL CODE. Would like to pursue interventions including L/RHC as recommended.

## 2021-09-07 NOTE — PROGRESS NOTE ADULT - PROBLEM SELECTOR PLAN 3
--Baseline Cr ~1.4-1.7. Uptrending Crea 1.38 -> 1.58 -> 1.70.   - Stable and within baseline range, now downtrending crea 1.65  --Avoid nephrotoxic agents, renally dose meds, monitor UOP and daily BMP.  --Holding further IV diuresis as now euvolemic (last Lasix 40mg IV BID 9/5/21 AM).  --Renal following given uptrending crea and pending dye load w/ cardiac cath  -Pending UA, UrNa, Urea, Osm, Creat, prot/creat ratio  -Obtain bladder scan PVR --Baseline Cr ~1.4-1.7.   - Stable and within baseline range, crea 1.63 today  --Avoid nephrotoxic agents, renally dose meds, monitor UOP and daily BMP.  --Holding further IV diuresis as now euvolemic (last Lasix 40mg IV BID 9/5/21 AM).  --Renal following given CKD 3 and pending dye load w/ cardiac cath  - Will hydrate cautiously given acute on chronic HFrEF exac this admission, EF 30-35%. Start NS 50cc/hr x 5hrs  -Pending UA, UrNa, Urea, Osm, Creat, prot/creat ratio  -Bladder scan PVR w/o urinary retention --Baseline Cr ~1.4-1.7.   - Stable and within baseline range, crea 1.63 today  --Avoid nephrotoxic agents, renally dose meds, monitor UOP and daily BMP.  --Holding further IV diuresis as now euvolemic (last Lasix 40mg IV BID 9/5/21 AM).  --Renal following given CKD 3 and pending dye load w/ cardiac cath  - Will hydrate cautiously given acute on chronic HFrEF exac this admission, EF 30-35%. Start NS 50cc/hr x 5hrs  -Urine lytes FENa 0.3%, prerenal  -Bladder scan PVR w/o urinary retention

## 2021-09-08 ENCOUNTER — TRANSCRIPTION ENCOUNTER (OUTPATIENT)
Age: 61
End: 2021-09-08

## 2021-09-08 VITALS
HEART RATE: 60 BPM | OXYGEN SATURATION: 96 % | SYSTOLIC BLOOD PRESSURE: 140 MMHG | DIASTOLIC BLOOD PRESSURE: 66 MMHG | RESPIRATION RATE: 18 BRPM

## 2021-09-08 LAB
ANION GAP SERPL CALC-SCNC: 10 MMOL/L — SIGNIFICANT CHANGE UP (ref 5–17)
BUN SERPL-MCNC: 26 MG/DL — HIGH (ref 7–23)
CALCIUM SERPL-MCNC: 8.8 MG/DL — SIGNIFICANT CHANGE UP (ref 8.4–10.5)
CHLORIDE SERPL-SCNC: 110 MMOL/L — HIGH (ref 96–108)
CO2 SERPL-SCNC: 24 MMOL/L — SIGNIFICANT CHANGE UP (ref 22–31)
CREAT SERPL-MCNC: 1.51 MG/DL — HIGH (ref 0.5–1.3)
GLUCOSE BLDC GLUCOMTR-MCNC: 124 MG/DL — HIGH (ref 70–99)
GLUCOSE BLDC GLUCOMTR-MCNC: 60 MG/DL — LOW (ref 70–99)
GLUCOSE BLDC GLUCOMTR-MCNC: 85 MG/DL — SIGNIFICANT CHANGE UP (ref 70–99)
GLUCOSE BLDC GLUCOMTR-MCNC: 93 MG/DL — SIGNIFICANT CHANGE UP (ref 70–99)
GLUCOSE SERPL-MCNC: 82 MG/DL — SIGNIFICANT CHANGE UP (ref 70–99)
HCT VFR BLD CALC: 42.1 % — SIGNIFICANT CHANGE UP (ref 39–50)
HGB BLD-MCNC: 12.9 G/DL — LOW (ref 13–17)
MAGNESIUM SERPL-MCNC: 1.9 MG/DL — SIGNIFICANT CHANGE UP (ref 1.6–2.6)
MCHC RBC-ENTMCNC: 25 PG — LOW (ref 27–34)
MCHC RBC-ENTMCNC: 30.6 GM/DL — LOW (ref 32–36)
MCV RBC AUTO: 81.7 FL — SIGNIFICANT CHANGE UP (ref 80–100)
NRBC # BLD: 0 /100 WBCS — SIGNIFICANT CHANGE UP (ref 0–0)
PLATELET # BLD AUTO: 200 K/UL — SIGNIFICANT CHANGE UP (ref 150–400)
POTASSIUM SERPL-MCNC: 4.2 MMOL/L — SIGNIFICANT CHANGE UP (ref 3.5–5.3)
POTASSIUM SERPL-SCNC: 4.2 MMOL/L — SIGNIFICANT CHANGE UP (ref 3.5–5.3)
RBC # BLD: 5.15 M/UL — SIGNIFICANT CHANGE UP (ref 4.2–5.8)
RBC # FLD: 15.7 % — HIGH (ref 10.3–14.5)
SODIUM SERPL-SCNC: 144 MMOL/L — SIGNIFICANT CHANGE UP (ref 135–145)
WBC # BLD: 6.84 K/UL — SIGNIFICANT CHANGE UP (ref 3.8–10.5)
WBC # FLD AUTO: 6.84 K/UL — SIGNIFICANT CHANGE UP (ref 3.8–10.5)

## 2021-09-08 PROCEDURE — 83935 ASSAY OF URINE OSMOLALITY: CPT

## 2021-09-08 PROCEDURE — 71045 X-RAY EXAM CHEST 1 VIEW: CPT

## 2021-09-08 PROCEDURE — 87635 SARS-COV-2 COVID-19 AMP PRB: CPT

## 2021-09-08 PROCEDURE — 94660 CPAP INITIATION&MGMT: CPT

## 2021-09-08 PROCEDURE — C1894: CPT

## 2021-09-08 PROCEDURE — 36415 COLL VENOUS BLD VENIPUNCTURE: CPT

## 2021-09-08 PROCEDURE — 83735 ASSAY OF MAGNESIUM: CPT

## 2021-09-08 PROCEDURE — 99232 SBSQ HOSP IP/OBS MODERATE 35: CPT

## 2021-09-08 PROCEDURE — 94640 AIRWAY INHALATION TREATMENT: CPT

## 2021-09-08 PROCEDURE — 85027 COMPLETE CBC AUTOMATED: CPT

## 2021-09-08 PROCEDURE — 85025 COMPLETE CBC W/AUTO DIFF WBC: CPT

## 2021-09-08 PROCEDURE — 93005 ELECTROCARDIOGRAM TRACING: CPT

## 2021-09-08 PROCEDURE — 81001 URINALYSIS AUTO W/SCOPE: CPT

## 2021-09-08 PROCEDURE — 97161 PT EVAL LOW COMPLEX 20 MIN: CPT

## 2021-09-08 PROCEDURE — 83036 HEMOGLOBIN GLYCOSYLATED A1C: CPT

## 2021-09-08 PROCEDURE — 83880 ASSAY OF NATRIURETIC PEPTIDE: CPT

## 2021-09-08 PROCEDURE — 84300 ASSAY OF URINE SODIUM: CPT

## 2021-09-08 PROCEDURE — 80053 COMPREHEN METABOLIC PANEL: CPT

## 2021-09-08 PROCEDURE — 86769 SARS-COV-2 COVID-19 ANTIBODY: CPT

## 2021-09-08 PROCEDURE — C1889: CPT

## 2021-09-08 PROCEDURE — 84443 ASSAY THYROID STIM HORMONE: CPT

## 2021-09-08 PROCEDURE — C1769: CPT

## 2021-09-08 PROCEDURE — 82553 CREATINE MB FRACTION: CPT

## 2021-09-08 PROCEDURE — 80061 LIPID PANEL: CPT

## 2021-09-08 PROCEDURE — 99239 HOSP IP/OBS DSCHRG MGMT >30: CPT

## 2021-09-08 PROCEDURE — 97116 GAIT TRAINING THERAPY: CPT

## 2021-09-08 PROCEDURE — 84156 ASSAY OF PROTEIN URINE: CPT

## 2021-09-08 PROCEDURE — 84484 ASSAY OF TROPONIN QUANT: CPT

## 2021-09-08 PROCEDURE — 82570 ASSAY OF URINE CREATININE: CPT

## 2021-09-08 PROCEDURE — 82962 GLUCOSE BLOOD TEST: CPT

## 2021-09-08 PROCEDURE — C1887: CPT

## 2021-09-08 PROCEDURE — 93306 TTE W/DOPPLER COMPLETE: CPT

## 2021-09-08 PROCEDURE — 82550 ASSAY OF CK (CPK): CPT

## 2021-09-08 PROCEDURE — 85730 THROMBOPLASTIN TIME PARTIAL: CPT

## 2021-09-08 PROCEDURE — 86803 HEPATITIS C AB TEST: CPT

## 2021-09-08 PROCEDURE — 85610 PROTHROMBIN TIME: CPT

## 2021-09-08 PROCEDURE — 80048 BASIC METABOLIC PNL TOTAL CA: CPT

## 2021-09-08 RX ORDER — DEXTROSE 50 % IN WATER 50 %
12.5 SYRINGE (ML) INTRAVENOUS ONCE
Refills: 0 | Status: COMPLETED | OUTPATIENT
Start: 2021-09-08 | End: 2021-09-08

## 2021-09-08 RX ORDER — ATORVASTATIN CALCIUM 80 MG/1
1 TABLET, FILM COATED ORAL
Qty: 30 | Refills: 0
Start: 2021-09-08 | End: 2021-10-07

## 2021-09-08 RX ORDER — SACUBITRIL AND VALSARTAN 24; 26 MG/1; MG/1
1 TABLET, FILM COATED ORAL
Refills: 0 | Status: DISCONTINUED | OUTPATIENT
Start: 2021-09-08 | End: 2021-09-08

## 2021-09-08 RX ORDER — SIMVASTATIN 20 MG/1
1 TABLET, FILM COATED ORAL
Qty: 0 | Refills: 0 | DISCHARGE

## 2021-09-08 RX ORDER — FUROSEMIDE 40 MG
1 TABLET ORAL
Qty: 0 | Refills: 0 | DISCHARGE

## 2021-09-08 RX ORDER — SPIRONOLACTONE 25 MG/1
1 TABLET, FILM COATED ORAL
Qty: 30 | Refills: 0
Start: 2021-09-08 | End: 2021-10-07

## 2021-09-08 RX ORDER — SACUBITRIL AND VALSARTAN 24; 26 MG/1; MG/1
1 TABLET, FILM COATED ORAL
Qty: 0 | Refills: 0 | DISCHARGE

## 2021-09-08 RX ORDER — ASPIRIN/CALCIUM CARB/MAGNESIUM 324 MG
1 TABLET ORAL
Qty: 30 | Refills: 0
Start: 2021-09-08 | End: 2021-10-07

## 2021-09-08 RX ORDER — SACUBITRIL AND VALSARTAN 24; 26 MG/1; MG/1
1 TABLET, FILM COATED ORAL
Qty: 60 | Refills: 0
Start: 2021-09-08 | End: 2021-10-07

## 2021-09-08 RX ORDER — PANTOPRAZOLE SODIUM 20 MG/1
1 TABLET, DELAYED RELEASE ORAL
Qty: 30 | Refills: 0
Start: 2021-09-08 | End: 2021-10-07

## 2021-09-08 RX ORDER — MAGNESIUM OXIDE 400 MG ORAL TABLET 241.3 MG
400 TABLET ORAL ONCE
Refills: 0 | Status: COMPLETED | OUTPATIENT
Start: 2021-09-08 | End: 2021-09-08

## 2021-09-08 RX ADMIN — MAGNESIUM OXIDE 400 MG ORAL TABLET 400 MILLIGRAM(S): 241.3 TABLET ORAL at 09:45

## 2021-09-08 RX ADMIN — Medication 15 UNIT(S): at 12:44

## 2021-09-08 RX ADMIN — PANTOPRAZOLE SODIUM 40 MILLIGRAM(S): 20 TABLET, DELAYED RELEASE ORAL at 06:55

## 2021-09-08 RX ADMIN — DULOXETINE HYDROCHLORIDE 60 MILLIGRAM(S): 30 CAPSULE, DELAYED RELEASE ORAL at 11:49

## 2021-09-08 RX ADMIN — HEPARIN SODIUM 7500 UNIT(S): 5000 INJECTION INTRAVENOUS; SUBCUTANEOUS at 06:54

## 2021-09-08 RX ADMIN — SACUBITRIL AND VALSARTAN 1 TABLET(S): 24; 26 TABLET, FILM COATED ORAL at 12:45

## 2021-09-08 RX ADMIN — Medication 12.5 GRAM(S): at 14:38

## 2021-09-08 RX ADMIN — Medication 10 MILLIGRAM(S): at 03:49

## 2021-09-08 RX ADMIN — ISOSORBIDE DINITRATE 5 MILLIGRAM(S): 5 TABLET ORAL at 06:54

## 2021-09-08 RX ADMIN — Medication 40 MILLIGRAM(S): at 11:49

## 2021-09-08 RX ADMIN — Medication 15 UNIT(S): at 08:21

## 2021-09-08 RX ADMIN — Medication 81 MILLIGRAM(S): at 09:45

## 2021-09-08 RX ADMIN — CARVEDILOL PHOSPHATE 25 MILLIGRAM(S): 80 CAPSULE, EXTENDED RELEASE ORAL at 09:45

## 2021-09-08 NOTE — CHART NOTE - NSCHARTNOTEFT_GEN_A_CORE
Patient seen by [facility] team again today.  Plan will be D/C home today and F/U with Dr. Sellers out-patient after getting dobutamine stress echo with out-pt cardiologist in [place].  Office number [phone] for Dr. Sellers.

## 2021-09-08 NOTE — DISCHARGE NOTE NURSING/CASE MANAGEMENT/SOCIAL WORK - PATIENT PORTAL LINK FT
You can access the FollowMyHealth Patient Portal offered by [facility] by registering at the following website: [email or web address]. By joining [facility]’s FollowMyHealth portal, you will also be able to view your health information using other applications (apps) compatible with our system.

## 2021-09-08 NOTE — DISCHARGE NOTE NURSING/CASE MANAGEMENT/SOCIAL WORK - NSDCPEFALRISK_GEN_ALL_CORE
For information on Fall & injury Prevention, visit [email or web address]

## 2021-09-08 NOTE — PROGRESS NOTE ADULT - ASSESSMENT
60 y/o morbidly obese Male with PMHx of CKD3 (baseline 1.4-1.7), HTN, HLD, IDDM c/b retinopathy, HFpEF w/ newly reduced EF, s/p AICD, non-obstructive CAD, ?Bicuspid AS, Moderate AS and TOMASA (not on CPAP) who presents with HF exacerbation in setting of reduced EF. Now s/p diuresis and cardiac cath, renal consulted.    CKD3- likely related to uncontrolled DM with hx of retinopathy; significant CV hx as well  Has known history of HTN   Baseline creat- 1.4-1.7;  -Does not have nephrologist   -will refer to Dr. Pollard on discharge to follow at [place]  -Creatinine within range today  -BP stable, Hgb at goal, lytes acceptable    Renal diet, avoid nephrotoxic meds, renally dose meds, strict I&Os      Maryjane Franklin D.O.  PGY 4 - Nephrology Fellow

## 2021-09-08 NOTE — PROGRESS NOTE ADULT - SUBJECTIVE AND OBJECTIVE BOX
Patient is a 61y Male seen and evaluated at bedside doing well this morning, cardiac cath went well yesterday with no acute complaints voiced otherwise. States he is breathing well with no SOB      Meds:    aspirin enteric coated 81 daily  atorvastatin 80 at bedtime  carvedilol 25 every 12 hours  cyclobenzaprine 5 three times a day PRN  dextrose 40% Gel 15 once  dextrose 5%. 1000 <Continuous>  dextrose 5%. 1000 <Continuous>  dextrose 50% Injectable 25 once  dextrose 50% Injectable 12.5 once  dextrose 50% Injectable 25 once  DULoxetine 60 daily  glucagon  Injectable 1 once  heparin   Injectable 7500 every 8 hours  insulin glargine Injectable (LANTUS) 45 at bedtime  insulin lispro (ADMELOG) corrective regimen sliding scale  Before meals and at bedtime  insulin lispro Injectable (ADMELOG) 15 three times a day before meals  pantoprazole    Tablet 40 before breakfast  sacubitril 24 mG/valsartan 26 mG 1 two times a day  spironolactone 25 daily  torsemide 40 daily      T(C): , Max: 36.9 (21 @ 22:35)  T(F): , Max: 98.5 (21 @ 22:35)  HR: 66 (21 @ 11:50)  BP: 121/66 (21 @ 11:50)  BP(mean): 85 (21 @ 06:08)  RR: 18 (21 @ 11:50)  SpO2: 96% (21 @ 11:50)  Wt(kg): --     07:  -   @ 07:00  --------------------------------------------------------  IN: 0 mL / OUT: 0 mL / NET: 0 mL     07:01  -   @ 11:56  --------------------------------------------------------  IN: 240 mL / OUT: 220 mL / NET: 20 mL          Review of Systems:  10 point ROS negative except as per HPI      PHYSICAL EXAM:  GENERAL: NAD  NECK: supple, No JVD  CHEST/LUNG: Clear to auscultation bilaterally  HEART: normal S1S2, RRR  ABDOMEN: Soft, Nontender, +BS, No flank tenderness bilateral  EXTREMITIES: No clubbing, cyanosis, or edema   NEUROLOGY: AAO x3, no focal neurological deficit  ACCESS: good thrill and bruit appreciated      LABS:                        12.9   6.84  )-----------( 200      ( 08 Sep 2021 07:26 )             42.1         144  |  110<H>  |  26<H>  ----------------------------<  82  4.2   |  24  |  1.51<H>    Ca    8.8      08 Sep 2021 07:26  Mg     1.9     -08        PT/INR - ( 07 Sep 2021 07:53 )   PT: 15.0 sec;   INR: 1.26          PTT - ( 07 Sep 2021 07:53 )  PTT:35.5 sec  Urinalysis Basic - ( 06 Sep 2021 21:05 )    Color: Yellow / Appearance: Clear / S.025 / pH: x  Gluc: x / Ketone: NEGATIVE  / Bili: Negative / Urobili: 1.0 E.U./dL   Blood: x / Protein: Trace mg/dL / Nitrite: NEGATIVE   Leuk Esterase: NEGATIVE / RBC: Many /HPF / WBC < 5 /HPF   Sq Epi: x / Non Sq Epi: 0-5 /HPF / Bacteria: Present /HPF      Creatinine, Random Urine: 263 mg/dL ( @ 21:05)  Sodium, Random Urine: 66 mmol/L ( @ 21:05)  Osmolality, Random Urine: 793 mosm/kg ( @ 21:05)        RADIOLOGY & ADDITIONAL STUDIES:

## 2021-09-08 NOTE — PROGRESS NOTE ADULT - ATTENDING COMMENTS
CKD 3, HTN, DM-   had cardiac cath done- NAP  BP and volume status okay-  would benefit from [id number]-  offered follow here at [facility], preferred closer to his home, suggested colleague in [place], Dr. Pollard- he will follow there- Will also let her know
See the Fellow's note written above, for details. I reviewed the fellow documentation.  I have personally seen and examined this patient today. I have reviewed vitals, labs, medications, and imaging. I agree with the fellow's findings and plans as written above with the following additions/amendments:    Patient seen and examined at bedside. Feels well, ready for procedure tomorrow, agreeable to risks as discussed.     .  VITAL SIGNS:  T(F): 98.3 (09-06-21 @ 18:11), Max: 98.3 (09-06-21 @ 18:11)  HR: 66 (09-06-21 @ 20:30) (56 - 70)  BP: 122/69 (09-06-21 @ 20:30) (108/65 - 146/87)  BP(mean): 99 (09-06-21 @ 16:05) (84 - 99)  RR: 18 (09-06-21 @ 20:30) (16 - 19)  SpO2: 97% (09-06-21 @ 20:30) (93% - 100%)    PHYSICAL EXAM:  Constitutional: Resting comfortably in bed; NAD  HEENT:  EOMI, anicteric sclera; MMM  Respiratory: CTA B/L; no W/R/R, no accessory muscle use  Cardiac: +S1/S2; RRR; no M/R/G  Gastrointestinal: soft, NT/ND; no rebound or guarding; +BS  Extremities: WWP, no clubbing or cyanosis; no peripheral edema  Dermatologic: skin warm, dry and intact; no rashes, wounds, or scars    Risks discussed with patient  Consider Poseidon protocol with pre-cath fluids guided by RHC pressures:   3 mL/hg for 1hr Pre-procedure  5 mL/kg/hr for LVEDP <13 mmHg; 3 mL/kg/hr for 13-18 mmHg; 1.5 mL/kg/hr for >18 mmHg  Continued during the procedure and for 4 hours post-procedure
Patient transferred from OSH with LVEF 30% and report of severe AS, TTE with moderate AS and mild-mod AI. Currently euvolemic on exam  Plan for  NPO for R/LHC and AS study today 9/7  CTSX evaluation for AS/AI, likely no intervention given mod degree of AS/AI  Hold IV lasix pending result of RHC   Carvedilol 25 BID for CHF GDMT  Entresto/ACE/ARB pending creatinine trend post LHC  Spironolactone 25 mg po daily  Hydralazine 10 TID/Isordil 5 TID for CHF GDMT initiated  Bedside CHF Education, supplies to be given to patient  Advanced CHF consult for assistance with management of decompensated state  EP consult for consideration of AICD/CRT  PT consult pending  Future planning: patient to have 1wk f/u appt made with Cardiologist for quality improvement CHF readmission risk reduction  Case discussed with patient and cardiac care team  Francia Molina M.D.  Cardiology Attending

## 2021-09-13 ENCOUNTER — APPOINTMENT (OUTPATIENT)
Dept: CARDIOLOGY | Facility: CLINIC | Age: 61
End: 2021-09-13

## 2021-09-14 ENCOUNTER — APPOINTMENT (OUTPATIENT)
Dept: RHEUMATOLOGY | Facility: CLINIC | Age: 61
End: 2021-09-14

## 2021-09-16 DIAGNOSIS — I35.0 NONRHEUMATIC AORTIC (VALVE) STENOSIS: ICD-10-CM

## 2021-09-16 DIAGNOSIS — I25.10 ATHEROSCLEROTIC HEART DISEASE OF NATIVE CORONARY ARTERY WITHOUT ANGINA PECTORIS: ICD-10-CM

## 2021-09-16 DIAGNOSIS — I35.1 NONRHEUMATIC AORTIC (VALVE) INSUFFICIENCY: ICD-10-CM

## 2021-09-16 DIAGNOSIS — I50.9 HEART FAILURE, UNSPECIFIED: ICD-10-CM

## 2021-09-16 DIAGNOSIS — I13.0 HYPERTENSIVE HEART AND CHRONIC KIDNEY DISEASE WITH HEART FAILURE AND STAGE 1 THROUGH STAGE 4 CHRONIC KIDNEY DISEASE, OR UNSPECIFIED CHRONIC KIDNEY DISEASE: ICD-10-CM

## 2021-09-16 DIAGNOSIS — N17.9 ACUTE KIDNEY FAILURE, UNSPECIFIED: ICD-10-CM

## 2021-09-16 DIAGNOSIS — E66.01 MORBID (SEVERE) OBESITY DUE TO EXCESS CALORIES: ICD-10-CM

## 2021-09-16 DIAGNOSIS — I07.1 RHEUMATIC TRICUSPID INSUFFICIENCY: ICD-10-CM

## 2021-09-16 DIAGNOSIS — E11.22 TYPE 2 DIABETES MELLITUS WITH DIABETIC CHRONIC KIDNEY DISEASE: ICD-10-CM

## 2021-09-16 DIAGNOSIS — I21.4 NON-ST ELEVATION (NSTEMI) MYOCARDIAL INFARCTION: ICD-10-CM

## 2021-09-16 DIAGNOSIS — G47.33 OBSTRUCTIVE SLEEP APNEA (ADULT) (PEDIATRIC): ICD-10-CM

## 2021-09-16 DIAGNOSIS — I27.20 PULMONARY HYPERTENSION, UNSPECIFIED: ICD-10-CM

## 2021-09-16 DIAGNOSIS — E11.319 TYPE 2 DIABETES MELLITUS WITH UNSPECIFIED DIABETIC RETINOPATHY WITHOUT MACULAR EDEMA: ICD-10-CM

## 2021-09-16 DIAGNOSIS — N18.30 CHRONIC KIDNEY DISEASE, STAGE 3 UNSPECIFIED: ICD-10-CM

## 2021-09-16 DIAGNOSIS — Z95.810 PRESENCE OF AUTOMATIC (IMPLANTABLE) CARDIAC DEFIBRILLATOR: ICD-10-CM

## 2021-09-16 DIAGNOSIS — I34.0 NONRHEUMATIC MITRAL (VALVE) INSUFFICIENCY: ICD-10-CM

## 2021-09-16 DIAGNOSIS — I50.43 ACUTE ON CHRONIC COMBINED SYSTOLIC (CONGESTIVE) AND DIASTOLIC (CONGESTIVE) HEART FAILURE: ICD-10-CM

## 2021-09-16 DIAGNOSIS — E78.5 HYPERLIPIDEMIA, UNSPECIFIED: ICD-10-CM

## 2021-09-22 ENCOUNTER — APPOINTMENT (OUTPATIENT)
Dept: ENDOCRINOLOGY | Facility: CLINIC | Age: 61
End: 2021-09-22

## 2021-10-04 ENCOUNTER — APPOINTMENT (OUTPATIENT)
Dept: INTERNAL MEDICINE | Facility: CLINIC | Age: 61
End: 2021-10-04

## 2021-10-08 PROBLEM — E78.5 HYPERLIPIDEMIA, UNSPECIFIED: Chronic | Status: ACTIVE | Noted: 2021-09-03

## 2021-10-08 PROBLEM — G47.33 OBSTRUCTIVE SLEEP APNEA (ADULT) (PEDIATRIC): Chronic | Status: ACTIVE | Noted: 2021-09-03

## 2021-10-08 PROBLEM — I50.32 CHRONIC DIASTOLIC (CONGESTIVE) HEART FAILURE: Chronic | Status: ACTIVE | Noted: 2021-09-03

## 2021-10-08 PROBLEM — E11.9 TYPE 2 DIABETES MELLITUS WITHOUT COMPLICATIONS: Chronic | Status: ACTIVE | Noted: 2021-09-03

## 2021-10-08 PROBLEM — I10 ESSENTIAL (PRIMARY) HYPERTENSION: Chronic | Status: ACTIVE | Noted: 2021-09-03

## 2021-10-08 PROBLEM — I25.10 ATHEROSCLEROTIC HEART DISEASE OF NATIVE CORONARY ARTERY WITHOUT ANGINA PECTORIS: Chronic | Status: ACTIVE | Noted: 2021-09-03

## 2021-10-08 PROBLEM — N18.30 CHRONIC KIDNEY DISEASE, STAGE 3 UNSPECIFIED: Chronic | Status: ACTIVE | Noted: 2021-09-03

## 2021-10-11 ENCOUNTER — APPOINTMENT (OUTPATIENT)
Dept: CARDIOLOGY | Facility: CLINIC | Age: 61
End: 2021-10-11

## 2021-12-13 ENCOUNTER — APPOINTMENT (OUTPATIENT)
Dept: CARDIOLOGY | Facility: CLINIC | Age: 61
End: 2021-12-13

## 2021-12-13 ENCOUNTER — APPOINTMENT (OUTPATIENT)
Dept: INTERNAL MEDICINE | Facility: CLINIC | Age: 61
End: 2021-12-13

## 2021-12-15 ENCOUNTER — APPOINTMENT (OUTPATIENT)
Dept: ENDOCRINOLOGY | Facility: CLINIC | Age: 61
End: 2021-12-15

## 2022-02-07 ENCOUNTER — APPOINTMENT (OUTPATIENT)
Dept: CARDIOLOGY | Facility: CLINIC | Age: 62
End: 2022-02-07
Payer: MEDICARE

## 2022-02-07 VITALS
DIASTOLIC BLOOD PRESSURE: 84 MMHG | HEART RATE: 74 BPM | WEIGHT: 315 LBS | HEIGHT: 72 IN | SYSTOLIC BLOOD PRESSURE: 134 MMHG | BODY MASS INDEX: 42.66 KG/M2 | OXYGEN SATURATION: 95 %

## 2022-02-07 PROCEDURE — 99213 OFFICE O/P EST LOW 20 MIN: CPT

## 2022-02-07 PROCEDURE — 93000 ELECTROCARDIOGRAM COMPLETE: CPT

## 2022-02-07 NOTE — REASON FOR VISIT
[Cardiac Failure] : cardiac failure [Arrhythmia/ECG Abnorrmalities] : arrhythmia/ECG abnormalities [Hypertension] : hypertension

## 2022-02-07 NOTE — HISTORY OF PRESENT ILLNESS
[FreeTextEntry1] : Mr. DIPESH JOHNSON  is a 61 year year old M with pmhx of HFpEF, moderate Bicuspid aortic stenosis (cath nov \par \par cath [facility]  has mild to moderate AS, 70 percent RPDA but normal FFR - no stent placed -\par \par   unable to tolerate treadmill, has moderate AS on echo - pseudonormalization of T waves as well as alternating bundle on minimal exertion. \par \par defib interrogated - few NSVT - asymptomatic. \par \par - has cough - no fevers - no orthopnea - no edema. Pt able to walk dog around block without chest pain or sob. \par \par Since last visit  - had episode of disorientation went to hospital in [place] and was transferred to [facility] - TAVR and subsequent stroke  - he was in rehab states he had echo 2-3 weeks ago. \par

## 2022-02-07 NOTE — ASSESSMENT
[FreeTextEntry1] : EKG \par May 2021 - NSR Vpaced. \par \par suboptimal stress echo to assess aortic valve with lumason - pt unable to obtain mphr. \par EF 53% ? INFERIOR/LATERAL HK - moderate LVH 1.3 cm. \par cath - distal disease\par started asa 81 mg\par \par Medtronic defib CLARIA - interrogation. \par \par defib interrogation. optival with fluid accumulation dec - improved\par thresholds wnl\par no significant events - few runs of NSVT. \par \par \par \par total time 30 min\par 3 months

## 2022-02-07 NOTE — PHYSICAL EXAM
[Well Developed] : well developed [Well Nourished] : well nourished [No Acute Distress] : no acute distress [Normal Conjunctiva] : normal conjunctiva [Normal Venous Pressure] : normal venous pressure [No Carotid Bruit] : no carotid bruit [Normal S1, S2] : normal S1, S2 [No Rub] : no rub [No Gallop] : no gallop [Clear Lung Fields] : clear lung fields [Good Air Entry] : good air entry [No Respiratory Distress] : no respiratory distress  [Soft] : abdomen soft [Non Tender] : non-tender [No Masses/organomegaly] : no masses/organomegaly [Normal Bowel Sounds] : normal bowel sounds [Normal Gait] : normal gait [No Edema] : no edema [No Cyanosis] : no cyanosis [No Clubbing] : no clubbing [No Varicosities] : no varicosities [No Rash] : no rash [No Skin Lesions] : no skin lesions [Moves all extremities] : moves all extremities [No Focal Deficits] : no focal deficits [Normal Speech] : normal speech [Alert and Oriented] : alert and oriented [Normal memory] : normal memory [de-identified] : obese [de-identified] : 3/6 SM

## 2022-02-14 ENCOUNTER — APPOINTMENT (OUTPATIENT)
Dept: ENDOCRINOLOGY | Facility: CLINIC | Age: 62
End: 2022-02-14
Payer: MEDICARE

## 2022-02-14 VITALS
WEIGHT: 292 LBS | SYSTOLIC BLOOD PRESSURE: 120 MMHG | HEIGHT: 72 IN | DIASTOLIC BLOOD PRESSURE: 60 MMHG | BODY MASS INDEX: 39.55 KG/M2 | HEART RATE: 88 BPM | OXYGEN SATURATION: 98 %

## 2022-02-14 LAB — GLUCOSE BLDC GLUCOMTR-MCNC: 91

## 2022-02-14 PROCEDURE — 82962 GLUCOSE BLOOD TEST: CPT

## 2022-02-14 PROCEDURE — 99215 OFFICE O/P EST HI 40 MIN: CPT | Mod: 25

## 2022-02-14 RX ORDER — TAMSULOSIN HYDROCHLORIDE 0.4 MG/1
0.4 CAPSULE ORAL
Qty: 1 | Refills: 0 | Status: ACTIVE | COMMUNITY
Start: 2021-09-13

## 2022-02-14 RX ORDER — LOSARTAN POTASSIUM 25 MG/1
25 TABLET, FILM COATED ORAL
Qty: 30 | Refills: 0 | Status: ACTIVE | COMMUNITY
Start: 2022-02-05

## 2022-02-14 RX ORDER — BLOOD-GLUCOSE,RECEIVER,CONT
EACH MISCELLANEOUS
Qty: 1 | Refills: 0 | Status: DISCONTINUED | COMMUNITY
Start: 2021-06-24 | End: 2022-02-14

## 2022-02-14 RX ORDER — INSULIN GLARGINE 100 [IU]/ML
100 INJECTION, SOLUTION SUBCUTANEOUS
Qty: 6 | Refills: 1 | Status: ACTIVE | COMMUNITY
Start: 2021-01-06 | End: 1900-01-01

## 2022-02-14 RX ORDER — INSULIN ADMIN. SUPPLIES
30G X 8 MM INSULIN PEN (EA) SUBCUTANEOUS
Qty: 100 | Refills: 0 | Status: ACTIVE | COMMUNITY
Start: 2022-02-05

## 2022-02-14 RX ORDER — DULAGLUTIDE 3 MG/.5ML
3 INJECTION, SOLUTION SUBCUTANEOUS
Qty: 3 | Refills: 0 | Status: DISCONTINUED | COMMUNITY
Start: 2020-11-06 | End: 2022-02-14

## 2022-02-14 RX ORDER — MIRTAZAPINE 7.5 MG/1
7.5 TABLET, FILM COATED ORAL
Qty: 30 | Refills: 0 | Status: ACTIVE | COMMUNITY
Start: 2022-02-05

## 2022-02-14 RX ORDER — INSULIN ASPART 100 [IU]/ML
100 INJECTION, SOLUTION INTRAVENOUS; SUBCUTANEOUS AS DIRECTED
Qty: 1 | Refills: 1 | Status: ACTIVE | COMMUNITY
Start: 2019-07-31 | End: 1900-01-01

## 2022-02-14 RX ORDER — PEN NEEDLE, DIABETIC 29 G X1/2"
32G X 4 MM NEEDLE, DISPOSABLE MISCELLANEOUS
Qty: 100 | Refills: 5 | Status: ACTIVE | COMMUNITY
Start: 2019-08-02 | End: 1900-01-01

## 2022-02-14 RX ORDER — FLASH GLUCOSE SENSOR
KIT MISCELLANEOUS
Qty: 6 | Refills: 1 | Status: ACTIVE | COMMUNITY
Start: 2022-02-10 | End: 1900-01-01

## 2022-02-14 RX ORDER — PANTOPRAZOLE 40 MG/1
40 TABLET, DELAYED RELEASE ORAL
Qty: 30 | Refills: 0 | Status: ACTIVE | COMMUNITY
Start: 2021-09-08

## 2022-02-14 RX ORDER — ISOSORBIDE DINITRATE 10 MG/1
10 TABLET ORAL
Qty: 60 | Refills: 0 | Status: ACTIVE | COMMUNITY
Start: 2022-02-05

## 2022-02-14 RX ORDER — DULAGLUTIDE 0.75 MG/.5ML
0.75 INJECTION, SOLUTION SUBCUTANEOUS
Qty: 6 | Refills: 0 | Status: DISCONTINUED | COMMUNITY
Start: 2020-11-23 | End: 2022-02-14

## 2022-02-14 RX ORDER — INSULIN LISPRO 100 [IU]/ML
100 INJECTION, SOLUTION INTRAVENOUS; SUBCUTANEOUS
Qty: 15 | Refills: 0 | Status: ACTIVE | COMMUNITY
Start: 2022-02-05

## 2022-02-14 RX ORDER — BLOOD-GLUCOSE SENSOR
EACH MISCELLANEOUS
Qty: 10 | Refills: 3 | Status: DISCONTINUED | COMMUNITY
Start: 2021-06-24 | End: 2022-02-14

## 2022-02-14 RX ORDER — DULAGLUTIDE 1.5 MG/.5ML
1.5 INJECTION, SOLUTION SUBCUTANEOUS
Qty: 2 | Refills: 0 | Status: ACTIVE | COMMUNITY
Start: 2021-09-13

## 2022-02-14 RX ORDER — BLOOD-GLUCOSE TRANSMITTER
EACH MISCELLANEOUS
Qty: 1 | Refills: 3 | Status: DISCONTINUED | COMMUNITY
Start: 2021-06-24 | End: 2022-02-14

## 2022-02-15 RX ORDER — LOSARTAN POTASSIUM AND HYDROCHLOROTHIAZIDE 12.5; 5 MG/1; MG/1
50-12.5 TABLET ORAL
Qty: 90 | Refills: 1 | Status: DISCONTINUED | COMMUNITY
Start: 1900-01-01 | End: 2022-02-15

## 2022-02-15 RX ORDER — FUROSEMIDE 80 MG/1
80 TABLET ORAL
Refills: 0 | Status: COMPLETED | COMMUNITY
End: 2022-02-15

## 2022-02-15 RX ORDER — BENZONATATE 100 MG/1
100 CAPSULE ORAL
Qty: 90 | Refills: 0 | Status: COMPLETED | COMMUNITY
Start: 2021-06-07 | End: 2022-02-15

## 2022-02-15 RX ORDER — FUROSEMIDE 80 MG/1
80 TABLET ORAL
Refills: 0 | Status: COMPLETED | COMMUNITY
Start: 2020-07-19 | End: 2022-02-15

## 2022-02-15 RX ORDER — CARVEDILOL 3.12 MG/1
3.12 TABLET, FILM COATED ORAL
Refills: 0 | Status: ACTIVE | COMMUNITY
Start: 2022-02-05

## 2022-02-15 RX ORDER — FUROSEMIDE 20 MG/1
20 TABLET ORAL
Refills: 0 | Status: ACTIVE | COMMUNITY
Start: 2022-02-05

## 2022-02-15 RX ORDER — CARVEDILOL 25 MG/1
25 TABLET, FILM COATED ORAL
Qty: 180 | Refills: 3 | Status: DISCONTINUED | COMMUNITY
Start: 2021-06-15 | End: 2022-02-15

## 2022-03-15 ENCOUNTER — APPOINTMENT (OUTPATIENT)
Dept: RHEUMATOLOGY | Facility: CLINIC | Age: 62
End: 2022-03-15

## 2022-03-28 ENCOUNTER — APPOINTMENT (OUTPATIENT)
Dept: INTERNAL MEDICINE | Facility: CLINIC | Age: 62
End: 2022-03-28
Payer: MEDICARE

## 2022-03-28 ENCOUNTER — APPOINTMENT (OUTPATIENT)
Dept: ENDOCRINOLOGY | Facility: CLINIC | Age: 62
End: 2022-03-28
Payer: MEDICARE

## 2022-03-28 VITALS
OXYGEN SATURATION: 96 % | SYSTOLIC BLOOD PRESSURE: 150 MMHG | WEIGHT: 290 LBS | DIASTOLIC BLOOD PRESSURE: 80 MMHG | BODY MASS INDEX: 39.28 KG/M2 | HEIGHT: 72 IN | HEART RATE: 69 BPM

## 2022-03-28 VITALS
BODY MASS INDEX: 39.28 KG/M2 | SYSTOLIC BLOOD PRESSURE: 126 MMHG | DIASTOLIC BLOOD PRESSURE: 68 MMHG | HEIGHT: 72 IN | OXYGEN SATURATION: 97 % | TEMPERATURE: 98.1 F | WEIGHT: 290 LBS | HEART RATE: 70 BPM

## 2022-03-28 DIAGNOSIS — E11.22 TYPE 2 DIABETES MELLITUS WITH DIABETIC CHRONIC KIDNEY DISEASE: ICD-10-CM

## 2022-03-28 DIAGNOSIS — M79.672 PAIN IN LEFT FOOT: ICD-10-CM

## 2022-03-28 DIAGNOSIS — Z23 ENCOUNTER FOR IMMUNIZATION: ICD-10-CM

## 2022-03-28 DIAGNOSIS — Z87.39 PERSONAL HISTORY OF OTHER DISEASES OF THE MUSCULOSKELETAL SYSTEM AND CONNECTIVE TISSUE: ICD-10-CM

## 2022-03-28 DIAGNOSIS — Z91.89 OTHER SPECIFIED PERSONAL RISK FACTORS, NOT ELSEWHERE CLASSIFIED: ICD-10-CM

## 2022-03-28 DIAGNOSIS — I50.9 HEART FAILURE, UNSPECIFIED: ICD-10-CM

## 2022-03-28 DIAGNOSIS — E78.5 HYPERLIPIDEMIA, UNSPECIFIED: ICD-10-CM

## 2022-03-28 DIAGNOSIS — M79.671 PAIN IN RIGHT FOOT: ICD-10-CM

## 2022-03-28 DIAGNOSIS — M25.559 PAIN IN UNSPECIFIED HIP: ICD-10-CM

## 2022-03-28 DIAGNOSIS — E66.01 MORBID (SEVERE) OBESITY DUE TO EXCESS CALORIES: ICD-10-CM

## 2022-03-28 DIAGNOSIS — N18.30 CHRONIC KIDNEY DISEASE, STAGE 3 UNSPECIFIED: ICD-10-CM

## 2022-03-28 DIAGNOSIS — I10 ESSENTIAL (PRIMARY) HYPERTENSION: ICD-10-CM

## 2022-03-28 DIAGNOSIS — E11.65 TYPE 2 DIABETES MELLITUS WITH DIABETIC CHRONIC KIDNEY DISEASE: ICD-10-CM

## 2022-03-28 DIAGNOSIS — I35.0 NONRHEUMATIC AORTIC (VALVE) STENOSIS: ICD-10-CM

## 2022-03-28 LAB — GLUCOSE BLDC GLUCOMTR-MCNC: 89

## 2022-03-28 PROCEDURE — 99215 OFFICE O/P EST HI 40 MIN: CPT

## 2022-03-28 PROCEDURE — 99214 OFFICE O/P EST MOD 30 MIN: CPT

## 2022-03-28 PROCEDURE — 82962 GLUCOSE BLOOD TEST: CPT

## 2022-03-28 RX ORDER — SPIRONOLACTONE 25 MG/1
25 TABLET ORAL
Refills: 0 | Status: DISCONTINUED | COMMUNITY
End: 2022-03-28

## 2022-03-28 RX ORDER — LOSARTAN POTASSIUM 50 MG/1
50 TABLET, FILM COATED ORAL
Refills: 0 | Status: DISCONTINUED | COMMUNITY
End: 2022-03-28

## 2022-03-28 RX ORDER — INSULIN DEGLUDEC INJECTION 100 U/ML
100 INJECTION, SOLUTION SUBCUTANEOUS
Qty: 10 | Refills: 0 | Status: DISCONTINUED | COMMUNITY
Start: 2021-09-13 | End: 2022-03-28

## 2022-03-28 RX ORDER — TORSEMIDE 20 MG/1
20 TABLET ORAL
Qty: 60 | Refills: 0 | Status: DISCONTINUED | COMMUNITY
Start: 2021-09-08 | End: 2022-03-28

## 2022-03-28 NOTE — HISTORY OF PRESENT ILLNESS
[FreeTextEntry1] : Mr. PINEDA JOHNSON is 60 year male .\par PINEDA  was diagnosed of diabetes type 2   years back. Currently he is on  basalgar 42 untis BID \par novlog 12 units bid with two major meals, and farxiga 10 mg daily \par he is tolerating these medications well. \par Glycemia control has been  poor \par has h/o CHF ,CKD \par working with podiatry  as well \par He denies any hyperglycemic symptoms.\par He is due for an annual exam, denies any low blood sugar reactions.  He definitely can improve on his diet.  He is never seen a diabetes educator and has refused to see one today\par Last A1c on 8 October 2020 was 11.7%\par \par \par 02/10/2021- FOLLOW UP\par \par no log to review \par currently on toujeo  80 untis \par plus ISS \par farxiga 10 mg daily \par on trulicity 0.75 weekly tolerating well \par no hypoglycemia \par Review of system \par no chest pain, no palpitations \par no Shortness of breath,no wheezing. \par \par \par 05/14/2021- FOLLOW UP\par currently on toujeo  80 untis \par plus novolog at dinner time \par farxiga 10 mg daily \par on trulicity 1.5 weekly tolerating well \par no logs to review today \par had ? pleural effusion\par no hypoglycemia \par \par \par 08/11/2021- FOLLOW UP\par Patient continues to take glargine 80 units at bedtime \par Very minimal sliding scale.  He continues to take Trulicity 3 mg weekly.  No logs to review.  He has had visual concerns and is closely following with an ophthalmologist.  He got approval for Dexcom but he has not started using it.\par \par 02/14/2022- FOLLOW UP\par Pineda presents for type 2 diabetes follow-up.  He was admitted to the hospital in October 2021 diagnosis of sepsis and then had a prolonged stay following a stroke he was just discharged from the rehab 2 weeks back.  His most current regimen includes Lantus 76 units and Humalog 14 units before meals.  He is not taking Trulicity and Farxiga at this point of time.  He did not like Dexcom and would like to transition to shazia.\par Today his blood sugar was 91 mg percent he was asymptomatic he does not report any hypoglycemia.  His small correction was given in the office and his blood sugar was 106 prior to discharge.  I have advised him to take Lantus 76 units continue Humalog but sliding scale was given to the patient he was advised to start Trulicity 1.5 once a week and start Farxiga 5 mg daily.  Check labs today.\par \par \par 03/28/2022- FOLLOW UP\par Presents for type 2 diabetes follow-up.  Unfortunately he still had an episode of low blood sugar today's blood sugar was 85 correction was given in the office blood sugar stabilized before discharge.  On his prior visit he was advised Lantus 76 units twice daily and Humalog per sliding scale.  And advised to get some labs done.  Unfortunately patient forgot to get labs done and also has not been following a sliding scale.  He got shazia to approved and has been using it he did not bring his  today so we could not download the report.  I also initiated him on Trulicity and Farxiga.  Considering his risk of hypoglycemia I have advised him to decrease the Lantus to 70 units twice daily.  I further encouraged him to use a sliding scale a written copy was given again to the patient.  Again I will discontinue Farxiga as I do not have renal function to trend.  Will keep him on Trulicity 1.5 once a week.  All questions were answered to family satisfaction.  He did spend considerable time with the diabetic nurse last time.  The patient and family are switching care to [facility]

## 2022-03-29 PROBLEM — I10 HTN (HYPERTENSION): Status: ACTIVE | Noted: 2019-07-29

## 2022-03-29 PROBLEM — E78.5 HYPERLIPIDEMIA: Status: ACTIVE | Noted: 2019-07-29

## 2022-03-29 PROBLEM — M79.671 RIGHT FOOT PAIN: Status: RESOLVED | Noted: 2021-02-12 | Resolved: 2022-03-29

## 2022-03-29 PROBLEM — E11.22 UNCONTROLLED TYPE 2 DIABETES MELLITUS WITH CHRONIC KIDNEY DISEASE: Status: ACTIVE | Noted: 2020-11-06

## 2022-03-29 PROBLEM — I50.9 CHF (CONGESTIVE HEART FAILURE): Status: ACTIVE | Noted: 2019-07-29

## 2022-03-29 PROBLEM — Z87.39 HISTORY OF LOW BACK PAIN: Status: RESOLVED | Noted: 2020-07-30 | Resolved: 2022-03-29

## 2022-03-29 PROBLEM — Z23 ENCOUNTER FOR IMMUNIZATION: Status: RESOLVED | Noted: 2020-10-08 | Resolved: 2022-03-29

## 2022-03-29 PROBLEM — I35.0 AORTIC STENOSIS, MODERATE: Status: ACTIVE | Noted: 2020-10-05

## 2022-03-29 PROBLEM — N18.30 CKD (CHRONIC KIDNEY DISEASE), STAGE III: Status: ACTIVE | Noted: 2020-03-13

## 2022-03-29 PROBLEM — M79.672 LEFT FOOT PAIN: Status: RESOLVED | Noted: 2021-02-12 | Resolved: 2022-03-29

## 2022-03-29 PROBLEM — M25.559 HIP PAIN: Status: RESOLVED | Noted: 2020-03-13 | Resolved: 2022-03-29

## 2022-03-29 PROBLEM — Z91.89 AT RISK FOR HYPOGLYCEMIA: Status: RESOLVED | Noted: 2022-03-28 | Resolved: 2022-03-29

## 2022-03-29 PROBLEM — E66.01 MORBID OBESITY: Status: ACTIVE | Noted: 2019-07-29

## 2022-03-29 NOTE — ASSESSMENT
[FreeTextEntry1] : Unsure of pt's med list. He is here today to say that he will be following at [facility] going forward as it is more convenient for him.

## 2022-03-29 NOTE — DATA REVIEWED
[FreeTextEntry1] : I reviewed last care mgmt note in Sept when pt was transferred to [facility] due to severe CHF\par As per cardio's last note, He had a TAVR at [facility], also had a stroke afterward and went to rehab

## 2022-03-29 NOTE — HEALTH RISK ASSESSMENT
[0] : 2) Feeling down, depressed, or hopeless: Not at all (0) [PHQ-2 Positive] : PHQ-2 Positive [[id number]] : 0

## 2022-03-29 NOTE — HISTORY OF PRESENT ILLNESS
[FreeTextEntry1] : follow up [de-identified] : Pt here for f/u visit. He will be following at [facility] from now on. Since his last visit with us, He had a stroke which left him with some memory issues. He had CABG done at [facility] as well. \par Pt is at home, living by himself. He has HHA for 5 days per week, 4 hours per day. His cousin Chidi checks on him.  She is here with him today. \par He has lost a significant amount of weight from his last visit until now. He last followed with me in 3/2021.

## 2022-03-29 NOTE — PHYSICAL EXAM
[Normal] : no acute distress, well nourished, well developed and well-appearing [No Respiratory Distress] : no respiratory distress  [No Accessory Muscle Use] : no accessory muscle use [Clear to Auscultation] : lungs were clear to auscultation bilaterally [Normal Rate] : normal rate  [Regular Rhythm] : with a regular rhythm [Normal S1, S2] : normal S1 and S2 [No Focal Deficits] : no focal deficits [Normal Affect] : the affect was normal [Normal Insight/Judgement] : insight and judgment were intact [de-identified] : obese [de-identified] : + PPM defib [de-identified] : walks with walker, moves all extremities

## 2022-05-06 ENCOUNTER — APPOINTMENT (OUTPATIENT)
Dept: HEART AND VASCULAR | Facility: CLINIC | Age: 62
End: 2022-05-06

## 2022-05-09 ENCOUNTER — APPOINTMENT (OUTPATIENT)
Dept: CARDIOLOGY | Facility: CLINIC | Age: 62
End: 2022-05-09

## 2022-05-17 ENCOUNTER — RX RENEWAL (OUTPATIENT)
Age: 62
End: 2022-05-17

## 2022-05-17 RX ORDER — DAPAGLIFLOZIN 5 MG/1
5 TABLET, FILM COATED ORAL DAILY
Qty: 90 | Refills: 0 | Status: ACTIVE | COMMUNITY
Start: 2022-02-14 | End: 1900-01-01

## 2022-10-11 ENCOUNTER — RX RENEWAL (OUTPATIENT)
Age: 62
End: 2022-10-11